# Patient Record
Sex: FEMALE | Race: ASIAN | Employment: OTHER | URBAN - METROPOLITAN AREA
[De-identification: names, ages, dates, MRNs, and addresses within clinical notes are randomized per-mention and may not be internally consistent; named-entity substitution may affect disease eponyms.]

---

## 2020-07-16 ENCOUNTER — HOSPITAL ENCOUNTER (OUTPATIENT)
Dept: VASCULAR SURGERY | Age: 45
Discharge: HOME | End: 2020-07-16
Attending: PSYCHIATRY & NEUROLOGY
Payer: OTHER GOVERNMENT

## 2020-07-16 DIAGNOSIS — H53.9 UNSPECIFIED VISUAL DISTURBANCE: ICD-10-CM

## 2020-07-16 LAB
LEFT CCA DIST DIAS: 24.1 CM/S
LEFT CCA DIST SYS: 79.7 CM/S
LEFT CCA PROX DIAS: 18.9 CM/S
LEFT CCA PROX SYS: 113.5 CM/S
LEFT ECA DIAS: 16.31 CM/S
LEFT ECA SYS: 66.8 CM/S
LEFT ICA DIST DIAS: 34.4 CM/S
LEFT ICA DIST SYS: 77.1 CM/S
LEFT ICA MID DIAS: 30.5 CM/S
LEFT ICA MID SYS: 65.5 CM/S
LEFT ICA PROX DIAS: 18.9 CM/S
LEFT ICA PROX SYS: 51.3 CM/S
LEFT ICA/CCA SYS: 0.97
LEFT SUBCLAVIAN DIAS: 0 CM/S
LEFT SUBCLAVIAN SYS: 158.9 CM/S
LEFT VERTEBRAL DIAS: 23.74 CM/S
LEFT VERTEBRAL SYS: 47.9 CM/S
RIGHT CCA DIST DIAS: 21.7 CM/S
RIGHT CCA DIST SYS: 81.7 CM/S
RIGHT CCA PROX DIAS: 15.1 CM/S
RIGHT CCA PROX SYS: 79 CM/S
RIGHT ECA DIAS: 13.83 CM/S
RIGHT ECA SYS: 75.1 CM/S
RIGHT ICA DIST DIAS: 20.4 CM/S
RIGHT ICA DIST SYS: 35.8 CM/S
RIGHT ICA MID DIAS: 34.9 CM/S
RIGHT ICA MID SYS: 70.4 CM/S
RIGHT ICA PROX DIAS: 14 CM/S
RIGHT ICA PROX SYS: 36 CM/S
RIGHT ICA/CCA SYS: 0.9
RIGHT SUBCLAVIAN DIAS: 0 CM/S
RIGHT SUBCLAVIAN SYS: 174.6 CM/S
RIGHT VERTEBRAL DIAS: 19.72 CM/S
RIGHT VERTEBRAL SYS: 43.3 CM/S

## 2020-07-16 PROCEDURE — 93880 EXTRACRANIAL BILAT STUDY: CPT

## 2021-04-08 ENCOUNTER — HOSPITAL ENCOUNTER (OUTPATIENT)
Dept: LAB | Age: 46
Discharge: HOME | End: 2021-04-08
Payer: OTHER GOVERNMENT

## 2021-04-08 LAB — VIT B12 SERPL-MCNC: 837 PG/ML (ref 211–911)

## 2021-04-08 PROCEDURE — 36415 COLL VENOUS BLD VENIPUNCTURE: CPT

## 2021-04-08 PROCEDURE — 82607 VITAMIN B-12: CPT

## 2021-04-09 LAB
FAX TO INFO,FAXT: NORMAL
FAX TO NUMBER,FAXN: NORMAL

## 2021-07-22 ENCOUNTER — HOSPITAL ENCOUNTER (INPATIENT)
Age: 46
LOS: 4 days | Discharge: HOME | DRG: 177 | End: 2021-07-26
Attending: EMERGENCY MEDICINE | Admitting: FAMILY MEDICINE
Payer: OTHER GOVERNMENT

## 2021-07-22 ENCOUNTER — APPOINTMENT (OUTPATIENT)
Dept: CT IMAGING | Age: 46
DRG: 177 | End: 2021-07-22
Attending: PHYSICIAN ASSISTANT
Payer: OTHER GOVERNMENT

## 2021-07-22 ENCOUNTER — APPOINTMENT (OUTPATIENT)
Dept: GENERAL RADIOLOGY | Age: 46
DRG: 177 | End: 2021-07-22
Attending: PHYSICIAN ASSISTANT
Payer: OTHER GOVERNMENT

## 2021-07-22 DIAGNOSIS — U07.1 ACUTE RESPIRATORY FAILURE DUE TO COVID-19 (HCC): ICD-10-CM

## 2021-07-22 DIAGNOSIS — U07.1 PNEUMONIA DUE TO COVID-19 VIRUS: Primary | ICD-10-CM

## 2021-07-22 DIAGNOSIS — R09.02 HYPOXIA: ICD-10-CM

## 2021-07-22 DIAGNOSIS — J12.82 PNEUMONIA DUE TO COVID-19 VIRUS: Primary | ICD-10-CM

## 2021-07-22 DIAGNOSIS — J96.00 ACUTE RESPIRATORY FAILURE DUE TO COVID-19 (HCC): ICD-10-CM

## 2021-07-22 PROBLEM — E66.9 OBESITY (BMI 30-39.9): Status: ACTIVE | Noted: 2021-07-22

## 2021-07-22 LAB
ALBUMIN SERPL-MCNC: 3 G/DL (ref 3.4–5)
ALBUMIN/GLOB SERPL: 0.6 {RATIO} (ref 0.8–1.7)
ALP SERPL-CCNC: 124 U/L (ref 45–117)
ALT SERPL-CCNC: 77 U/L (ref 13–56)
ANION GAP SERPL CALC-SCNC: 9 MMOL/L (ref 3–18)
APPEARANCE UR: CLEAR
AST SERPL-CCNC: 81 U/L (ref 10–38)
BASOPHILS # BLD: 0 K/UL (ref 0–0.1)
BASOPHILS NFR BLD: 0 % (ref 0–2)
BILIRUB SERPL-MCNC: 0.3 MG/DL (ref 0.2–1)
BILIRUB UR QL: NEGATIVE
BNP SERPL-MCNC: 123 PG/ML (ref 0–450)
BUN SERPL-MCNC: 8 MG/DL (ref 7–18)
BUN/CREAT SERPL: 14 (ref 12–20)
CA-I BLD-SCNC: 1.16 MMOL/L (ref 1.12–1.32)
CALCIUM SERPL-MCNC: 8.7 MG/DL (ref 8.5–10.1)
CHLORIDE SERPL-SCNC: 104 MMOL/L (ref 100–111)
CK MB CFR SERPL CALC: NORMAL % (ref 0–4)
CK MB SERPL-MCNC: <1 NG/ML (ref 5–25)
CK SERPL-CCNC: 51 U/L (ref 26–192)
CO2 SERPL-SCNC: 29 MMOL/L (ref 21–32)
COLOR UR: YELLOW
COVID-19 RAPID TEST, COVR: DETECTED
CREAT SERPL-MCNC: 0.58 MG/DL (ref 0.6–1.3)
D DIMER PPP FEU-MCNC: 1.89 UG/ML(FEU)
DIFFERENTIAL METHOD BLD: ABNORMAL
EOSINOPHIL # BLD: 0 K/UL (ref 0–0.4)
EOSINOPHIL NFR BLD: 0 % (ref 0–5)
ERYTHROCYTE [DISTWIDTH] IN BLOOD BY AUTOMATED COUNT: 12.8 % (ref 11.6–14.5)
GLOBULIN SER CALC-MCNC: 5 G/DL (ref 2–4)
GLUCOSE SERPL-MCNC: 102 MG/DL (ref 74–99)
GLUCOSE UR STRIP.AUTO-MCNC: NEGATIVE MG/DL
HCG SERPL QL: NEGATIVE
HCG UR QL: NEGATIVE
HCT VFR BLD AUTO: 46.9 % (ref 35–45)
HGB BLD-MCNC: 15 G/DL (ref 12–16)
HGB UR QL STRIP: NEGATIVE
KETONES UR QL STRIP.AUTO: NEGATIVE MG/DL
LACTATE BLD-SCNC: 1.39 MMOL/L (ref 0.4–2)
LEUKOCYTE ESTERASE UR QL STRIP.AUTO: NEGATIVE
LYMPHOCYTES # BLD: 1.1 K/UL (ref 0.9–3.6)
LYMPHOCYTES NFR BLD: 14 % (ref 21–52)
MCH RBC QN AUTO: 29.1 PG (ref 24–34)
MCHC RBC AUTO-ENTMCNC: 32 G/DL (ref 31–37)
MCV RBC AUTO: 90.9 FL (ref 74–97)
MONOCYTES # BLD: 0.3 K/UL (ref 0.05–1.2)
MONOCYTES NFR BLD: 4 % (ref 3–10)
NEUTS BAND NFR BLD MANUAL: 6 % (ref 0–5)
NEUTS SEG # BLD: 6.2 K/UL (ref 1.8–8)
NEUTS SEG NFR BLD: 76 % (ref 40–73)
NITRITE UR QL STRIP.AUTO: NEGATIVE
PH UR STRIP: 8.5 [PH] (ref 5–8)
PLATELET # BLD AUTO: 263 K/UL (ref 135–420)
PMV BLD AUTO: 9.7 FL (ref 9.2–11.8)
POTASSIUM SERPL-SCNC: 4 MMOL/L (ref 3.5–5.5)
PROT SERPL-MCNC: 8 G/DL (ref 6.4–8.2)
PROT UR STRIP-MCNC: NEGATIVE MG/DL
RBC # BLD AUTO: 5.16 M/UL (ref 4.2–5.3)
RBC MORPH BLD: ABNORMAL
SODIUM SERPL-SCNC: 142 MMOL/L (ref 136–145)
SOURCE, COVRS: ABNORMAL
SP GR UR REFRACTOMETRY: 1.02 (ref 1–1.03)
TROPONIN I SERPL-MCNC: <0.02 NG/ML (ref 0–0.04)
UROBILINOGEN UR QL STRIP.AUTO: 1 EU/DL (ref 0.2–1)
WBC # BLD AUTO: 7.6 K/UL (ref 4.6–13.2)
WBC MORPH BLD: ABNORMAL

## 2021-07-22 PROCEDURE — 94761 N-INVAS EAR/PLS OXIMETRY MLT: CPT

## 2021-07-22 PROCEDURE — 74011250637 HC RX REV CODE- 250/637: Performed by: PHYSICIAN ASSISTANT

## 2021-07-22 PROCEDURE — 83880 ASSAY OF NATRIURETIC PEPTIDE: CPT

## 2021-07-22 PROCEDURE — 87040 BLOOD CULTURE FOR BACTERIA: CPT

## 2021-07-22 PROCEDURE — 71275 CT ANGIOGRAPHY CHEST: CPT

## 2021-07-22 PROCEDURE — 81003 URINALYSIS AUTO W/O SCOPE: CPT

## 2021-07-22 PROCEDURE — 74011250637 HC RX REV CODE- 250/637: Performed by: FAMILY MEDICINE

## 2021-07-22 PROCEDURE — 81025 URINE PREGNANCY TEST: CPT

## 2021-07-22 PROCEDURE — 85379 FIBRIN DEGRADATION QUANT: CPT

## 2021-07-22 PROCEDURE — 71045 X-RAY EXAM CHEST 1 VIEW: CPT

## 2021-07-22 PROCEDURE — 86141 C-REACTIVE PROTEIN HS: CPT

## 2021-07-22 PROCEDURE — 84703 CHORIONIC GONADOTROPIN ASSAY: CPT

## 2021-07-22 PROCEDURE — 80053 COMPREHEN METABOLIC PANEL: CPT

## 2021-07-22 PROCEDURE — 65660000000 HC RM CCU STEPDOWN

## 2021-07-22 PROCEDURE — 74011000636 HC RX REV CODE- 636: Performed by: EMERGENCY MEDICINE

## 2021-07-22 PROCEDURE — 83605 ASSAY OF LACTIC ACID: CPT

## 2021-07-22 PROCEDURE — 87635 SARS-COV-2 COVID-19 AMP PRB: CPT

## 2021-07-22 PROCEDURE — 85025 COMPLETE CBC W/AUTO DIFF WBC: CPT

## 2021-07-22 PROCEDURE — 99285 EMERGENCY DEPT VISIT HI MDM: CPT

## 2021-07-22 PROCEDURE — 96374 THER/PROPH/DIAG INJ IV PUSH: CPT

## 2021-07-22 PROCEDURE — 74011250636 HC RX REV CODE- 250/636: Performed by: PHYSICIAN ASSISTANT

## 2021-07-22 PROCEDURE — 82330 ASSAY OF CALCIUM: CPT

## 2021-07-22 PROCEDURE — 82553 CREATINE MB FRACTION: CPT

## 2021-07-22 PROCEDURE — 74011250636 HC RX REV CODE- 250/636: Performed by: FAMILY MEDICINE

## 2021-07-22 RX ORDER — ONDANSETRON 2 MG/ML
4 INJECTION INTRAMUSCULAR; INTRAVENOUS
Status: DISCONTINUED | OUTPATIENT
Start: 2021-07-22 | End: 2021-07-26 | Stop reason: HOSPADM

## 2021-07-22 RX ORDER — SODIUM CHLORIDE 0.9 % (FLUSH) 0.9 %
5-40 SYRINGE (ML) INJECTION EVERY 8 HOURS
Status: DISCONTINUED | OUTPATIENT
Start: 2021-07-22 | End: 2021-07-26 | Stop reason: HOSPADM

## 2021-07-22 RX ORDER — POLYETHYLENE GLYCOL 3350 17 G/17G
17 POWDER, FOR SOLUTION ORAL DAILY PRN
Status: DISCONTINUED | OUTPATIENT
Start: 2021-07-22 | End: 2021-07-26 | Stop reason: HOSPADM

## 2021-07-22 RX ORDER — DEXAMETHASONE SODIUM PHOSPHATE 10 MG/ML
10 INJECTION INTRAMUSCULAR; INTRAVENOUS
Status: COMPLETED | OUTPATIENT
Start: 2021-07-22 | End: 2021-07-22

## 2021-07-22 RX ORDER — DULOXETIN HYDROCHLORIDE 30 MG/1
30 CAPSULE, DELAYED RELEASE ORAL DAILY
COMMUNITY

## 2021-07-22 RX ORDER — DEXAMETHASONE 4 MG/1
6 TABLET ORAL DAILY
Status: DISCONTINUED | OUTPATIENT
Start: 2021-07-22 | End: 2021-07-26 | Stop reason: HOSPADM

## 2021-07-22 RX ORDER — LORAZEPAM 1 MG/1
1 TABLET ORAL
Status: COMPLETED | OUTPATIENT
Start: 2021-07-22 | End: 2021-07-22

## 2021-07-22 RX ORDER — SODIUM CHLORIDE 0.9 % (FLUSH) 0.9 %
5-40 SYRINGE (ML) INJECTION AS NEEDED
Status: DISCONTINUED | OUTPATIENT
Start: 2021-07-22 | End: 2021-07-26 | Stop reason: HOSPADM

## 2021-07-22 RX ORDER — ACETAMINOPHEN 650 MG/1
650 SUPPOSITORY RECTAL
Status: DISCONTINUED | OUTPATIENT
Start: 2021-07-22 | End: 2021-07-26 | Stop reason: HOSPADM

## 2021-07-22 RX ORDER — ASPIRIN 81 MG/1
81 TABLET ORAL DAILY
COMMUNITY

## 2021-07-22 RX ORDER — FAMOTIDINE 20 MG/1
20 TABLET, FILM COATED ORAL 2 TIMES DAILY
Status: DISCONTINUED | OUTPATIENT
Start: 2021-07-22 | End: 2021-07-26 | Stop reason: HOSPADM

## 2021-07-22 RX ORDER — ONDANSETRON 4 MG/1
4 TABLET, ORALLY DISINTEGRATING ORAL
Status: DISCONTINUED | OUTPATIENT
Start: 2021-07-22 | End: 2021-07-26 | Stop reason: HOSPADM

## 2021-07-22 RX ORDER — ENOXAPARIN SODIUM 100 MG/ML
30 INJECTION SUBCUTANEOUS EVERY 12 HOURS
Status: DISCONTINUED | OUTPATIENT
Start: 2021-07-22 | End: 2021-07-26 | Stop reason: HOSPADM

## 2021-07-22 RX ORDER — ACETAMINOPHEN 325 MG/1
650 TABLET ORAL
Status: DISCONTINUED | OUTPATIENT
Start: 2021-07-22 | End: 2021-07-26 | Stop reason: HOSPADM

## 2021-07-22 RX ADMIN — LORAZEPAM 1 MG: 1 TABLET ORAL at 19:00

## 2021-07-22 RX ADMIN — ENOXAPARIN SODIUM 30 MG: 40 INJECTION SUBCUTANEOUS at 21:45

## 2021-07-22 RX ADMIN — DEXAMETHASONE 6 MG: 4 TABLET ORAL at 21:45

## 2021-07-22 RX ADMIN — FAMOTIDINE 20 MG: 20 TABLET ORAL at 21:46

## 2021-07-22 RX ADMIN — IOPAMIDOL 100 ML: 755 INJECTION, SOLUTION INTRAVENOUS at 19:33

## 2021-07-22 RX ADMIN — Medication 10 ML: at 21:46

## 2021-07-22 RX ADMIN — SODIUM CHLORIDE, SODIUM LACTATE, POTASSIUM CHLORIDE, AND CALCIUM CHLORIDE 1000 ML: 600; 310; 30; 20 INJECTION, SOLUTION INTRAVENOUS at 18:02

## 2021-07-22 RX ADMIN — DEXAMETHASONE SODIUM PHOSPHATE 10 MG: 10 INJECTION, SOLUTION INTRAMUSCULAR; INTRAVENOUS at 18:02

## 2021-07-22 NOTE — H&P
History & Physical    Patient: Tristan eSnior MRN: [id number]  CSN: [id number]    YOB: 1975  Age: 54 y.o. Sex: female      DOA: 7/22/2021  Primary Care Provider:  Tiffanie Wilde MD      Assessment/Plan     Patient Active Problem List   Diagnosis Code    Hypoxia R09.02    Pneumonia due to COVID-19 virus U07.1, J12.82    Obesity (BMI 30-39. 9) E779     58-year-old female with obesity who is unvaccinated for Covid is admitted for pneumonia due to COVID-19 and hypoxia. COVID-19 pneumonia with hypoxia  2 L nasal cannula for supplemental oxygen, wean as tolerated  Oral dexamethasone protocol for 10 days  Covid labs  Not a candidate for remdesivir as she is over 7 days of illness    Obesityrisk factor for increased severity of Covid infection  --Aggressive lifestyle modification needed  --Follow up hemoglobin A1C and lipid panel    Full code    Prophylaxis: Pepcid p.o., Lovenox SQ    Estimated length of stay : 3-4 nights    MD Magan Vazquezurnist  ----------------------------------------------------------------------------------------------------------------------------------------------------------------------------------------------------------------  CC: Covid positive, shortness of breath       HPI:     Tristan Senior is a 54 y.o. female with obesity who is not vaccinated for Covid and tested positive last week at her PCP office presents via EMS for shortness of breath. Her son is on a travel baseball team and she was exposed from another child who was symptomatic while at an away game. Unfortunately, they were too busy with baseball over the summer to get the vaccine. Multiple family members of hers are also now Covid positive. She has had fever, congestion, myalgias, chest tightness, and cough. Past Medical History:   Diagnosis Date    Obesity (BMI 30-39. 9) 7/22/2021       History reviewed. No pertinent surgical history.     Family History   Problem Relation Age of Onset    Glaucoma Mother        Social History     Socioeconomic History    Marital status:      Spouse name: Not on file    Number of children: Not on file    Years of education: Not on file    Highest education level: Not on file   Tobacco Use    Smoking status: Never Smoker    Smokeless tobacco: Never Used   Other Topics Concern     Social Determinants of Health     Financial Resource Strain:     Difficulty of Paying Living Expenses:    Food Insecurity:     Worried About Running Out of Food in the Last Year:     [address] in the Last Year:    Transportation Needs:     Lack of Transportation (Medical):  Lack of Transportation (Non-Medical):    Physical Activity:     Days of Exercise per Week:     Minutes of Exercise per Session:    Stress:     Feeling of Stress :    Social Connections:     Frequency of Communication with Friends and Family:     Frequency of Social Gatherings with Friends and Family:     Attends Jew Services:     Active Member of Clubs or Organizations:     Attends Club or Organization Meetings:     Marital Status:        Prior to Admission medications    Medication Sig Start Date End Date Taking? Authorizing Provider   DULoxetine (Cymbalta) 30 mg capsule Take 30 mg by mouth daily. Yes Other, MD Eliseo   aspirin delayed-release 81 mg tablet Take 81 mg by mouth daily. Yes Provider, Historical       No Known Allergies    Review of Systems  Gen: +fever, chills, malaise, no weight loss/gain. Heent: +headache, rhinorrhea, sore throat. Heart: +chest pain, FERNANDEZ. Resp: +cough, wheezing and shortness of breath. GI: No nausea, vomiting, diarrhea, constipation, melena or hematochezia. : No urinary obstruction, dysuria or hematuria. Derm: No rash, new skin lesion or pruritis. Musc/skeletal: no bone or joint complaints. Vasc: No edema, cyanosis or claudication. Endo: No heat/cold intolerance, no polyuria,polydipsia or polyphagia. Neuro: No unilateral weakness, numbness, tingling. No seizures. Heme: No easy bruising or bleeding. Physical Exam:     Physical Exam:  Visit Vitals  /84   Pulse 66   Temp 98.4 °F (36.9 °C)   Resp 18   Wt 93.1 kg (205 lb 4.8 oz)   LMP 2021 (Approximate)   SpO2 94%    O2 Flow Rate (L/min): 2 l/min O2 Device: Nasal cannula    Temp (24hrs), Av.3 °F (36.8 °C), Min:97.7 °F (36.5 °C), Max:98.7 °F (37.1 °C)    1901 -  07  In: 480 [P.O.:480]  Out: 600 [Urine:600]   701 - 1900  In: 1000 [I.V.:1000]  Out: -     General:  Awake, cooperative, no distress. Head:  Normocephalic, without obvious abnormality, atraumatic. Eyes:  Conjunctivae/corneas clear, sclera anicteric. Neck: Supple, symmetrical, trachea midline, no adenopathy. Lungs:   Coarse breath sounds in the bilateral lower lung bases. Heart:  Regular rate and rhythm, S1, S2 normal, no murmur, click, rub or gallop. Abdomen: Soft, non-tender. Bowel sounds normal. No masses,  No organomegaly. Extremities: Extremities normal, atraumatic, no cyanosis or edema. Capillary refill normal.   Pulses: 2+ and symmetric all extremities. Skin: Skin color pink, turgor normal. No rashes or lesions   Neurologic: No focal motor or sensory deficit. Labs Reviewed: All lab results for the last 24 hours reviewed.   Recent Results (from the past 24 hour(s))   POC LACTIC ACID    Collection Time: 21  5:45 PM   Result Value Ref Range    Lactic Acid (POC) 1.39 0.40 - 2.00 mmol/L   CBC WITH AUTOMATED DIFF    Collection Time: 21  5:50 PM   Result Value Ref Range    WBC 7.6 4.6 - 13.2 K/uL    RBC 5.16 4.20 - 5.30 M/uL    HGB 15.0 12.0 - 16.0 g/dL    HCT 46.9 (H) 35.0 - 45.0 %    MCV 90.9 74.0 - 97.0 FL    MCH 29.1 24.0 - 34.0 PG    MCHC 32.0 31.0 - 37.0 g/dL    RDW 12.8 11.6 - 14.5 %    PLATELET 271 241 - 242 K/uL    MPV 9.7 9.2 - 11.8 FL    NEUTROPHILS 76 (H) 40 - 73 %    BAND NEUTROPHILS 6 (H) 0 - 5 % LYMPHOCYTES 14 (L) 21 - 52 %    MONOCYTES 4 3 - 10 %    EOSINOPHILS 0 0 - 5 %    BASOPHILS 0 0 - 2 %    ABS. NEUTROPHILS 6.2 1.8 - 8.0 K/UL    ABS. LYMPHOCYTES 1.1 0.9 - 3.6 K/UL    ABS. MONOCYTES 0.3 0.05 - 1.2 K/UL    ABS. EOSINOPHILS 0.0 0.0 - 0.4 K/UL    ABS. BASOPHILS 0.0 0.0 - 0.1 K/UL    DF MANUAL      RBC COMMENTS NORMOCYTIC, NORMOCHROMIC      WBC COMMENTS REACTIVE LYMPHS     METABOLIC PANEL, COMPREHENSIVE    Collection Time: 07/22/21  5:50 PM   Result Value Ref Range    Sodium 142 136 - 145 mmol/L    Potassium 4.0 3.5 - 5.5 mmol/L    Chloride 104 100 - 111 mmol/L    CO2 29 21 - 32 mmol/L    Anion gap 9 3.0 - 18 mmol/L    Glucose 102 (H) 74 - 99 mg/dL    BUN 8 7.0 - 18 MG/DL    Creatinine 0.58 (L) 0.6 - 1.3 MG/DL    BUN/Creatinine ratio 14 12 - 20      GFR est AA >60 >60 ml/min/1.73m2    GFR est non-AA >60 >60 ml/min/1.73m2    Calcium 8.7 8.5 - 10.1 MG/DL    Bilirubin, total 0.3 0.2 - 1.0 MG/DL    ALT (SGPT) 77 (H) 13 - 56 U/L    AST (SGOT) 81 (H) 10 - 38 U/L    Alk.  phosphatase 124 (H) 45 - 117 U/L    Protein, total 8.0 6.4 - 8.2 g/dL    Albumin 3.0 (L) 3.4 - 5.0 g/dL    Globulin 5.0 (H) 2.0 - 4.0 g/dL    A-G Ratio 0.6 (L) 0.8 - 1.7     CARDIAC PANEL,(CK, CKMB & TROPONIN)    Collection Time: 07/22/21  5:50 PM   Result Value Ref Range    CK - MB <1.0 <3.6 ng/ml    CK-MB Index  0.0 - 4.0 %     CALCULATION NOT PERFORMED WHEN RESULT IS BELOW LINEAR LIMIT    CK 51 26 - 192 U/L    Troponin-I, QT <0.02 0.0 - 0.045 NG/ML   NT-PRO BNP    Collection Time: 07/22/21  5:50 PM   Result Value Ref Range    NT pro- 0 - 450 PG/ML   D DIMER    Collection Time: 07/22/21  5:50 PM   Result Value Ref Range    D DIMER 1.89 (H) <0.46 ug/ml(FEU)   HCG QL SERUM    Collection Time: 07/22/21  5:50 PM   Result Value Ref Range    HCG, Ql. Negative NEG     IONIZED CALCIUM    Collection Time: 07/22/21  6:50 PM   Result Value Ref Range    Calcium, ionized 1.16 1.12 - 1.32 mmol/L   URINALYSIS W/ RFLX MICROSCOPIC    Collection Time: 07/22/21  7:00 PM   Result Value Ref Range    Color YELLOW      Appearance CLEAR      Specific gravity 1.017 1.005 - 1.030      pH (UA) 8.5 (H) 5.0 - 8.0      Protein Negative NEG mg/dL    Glucose Negative NEG mg/dL    Ketone Negative NEG mg/dL    Bilirubin Negative NEG      Blood Negative NEG      Urobilinogen 1.0 0.2 - 1.0 EU/dL    Nitrites Negative NEG      Leukocyte Esterase Negative NEG     COVID-19 RAPID TEST    Collection Time: 07/22/21  7:00 PM   Result Value Ref Range    Specimen source Nasopharyngeal      COVID-19 rapid test Detected (AA) NOTD     HCG URINE, QL. - POC    Collection Time: 07/22/21  7:06 PM   Result Value Ref Range    Pregnancy test,urine (POC) Negative NEG         Results  XR CHEST PORT (Accession [id number]) (Order [id number])  Allergies     No Known Allergies   Exam Information    Status Exam Begun  Exam Ended    Final [99] 7/22/2021 18:06 7/22/2021  6:20 PM [id number]  6:20 PM   Result Information    Status: Final result (Exam End: 7/22/2021 18:20) Provider Status: Open   Study Result    Narrative & Impression   EXAM:  AP Portable Chest X-ray 1 view      INDICATION: Shortness of breath Covid positive cough     COMPARISON: None     _______________     FINDINGS:  Heart and mediastinal contours are within normal limits for portable  radiograph. There are patchy alveolar and interstitial infiltrates of both lung  bases left greater than right. . There are no pleural effusions. No acute osseous  findings.     ________________        IMPRESSION  Patchy interstitial and alveolar infiltrates left greater than  right.          Results  CTA CHEST W OR W WO CONT (Accession [id number]) (Order [id number])  Allergies     No Known Allergies   Exam Information    Status Exam Begun  Exam Ended    Final [99] 7/22/2021 19:14 7/22/2021  7:43 PM [id number]  7:43 PM   Result Information    Status: Final result (Exam End: 7/22/2021 19:43) Provider Status: Open   Study Result    Narrative & Impression   EXAM: CTA chest     INDICATION: Hypoxia Covid positive     COMPARISON: None     TECHNIQUE: Axial CT imaging from the thoracic inlet through the diaphragm with  intravenous contrast. Coronal and sagittal MIP reformats were generated. One or  more dose reduction techniques were used on this CT: automated exposure control,  adjustment of the mAs and/or kVp according to patient size, and iterative  reconstruction techniques. The specific techniques used on this CT exam have  been documented in the patient's electronic medical record. Digital Imaging and  Communications in Medicine (DICOM) format image data are available to  nonaffiliated external healthcare facilities or entities on a secure, media  free, reciprocally searchable basis with patient authorization for at least a  12-month period after this study.     _______________     FINDINGS:     EXAM QUALITY: Overall exam quality is satisfactory. Pulmonary arterial  enhancement is adequate with adequate breath hold and no significant artifact.     PULMONARY ARTERIES: No evidence of pulmonary embolism.     LYMPH Nodes: No enlarged lymph nodes seen.     PLEURA: No pleural effusion seen.     HEART: Normal in size without pericardial effusion.     VASCULATURE/MEDIASTINUM: There is no aortic dissection. Small hiatal hernia  present.     LUNGS: There are diffuse bilateral patchy infiltrates and groundglass densities  suggesting atypical pneumonia. The findings are typical in appearance for Covid  19 pneumonia.     AIRWAY: Normal.     UPPER ABDOMEN: Unremarkable.     OTHER: No acute or aggressive osseous abnormalities identified.     _______________     IMPRESSION     No evidence of a pulmonary embolism or aortic dissection.     Diffuse patchy infiltrates and groundglass densities bilaterally suggesting  atypical pneumonia. The findings are typical in appearance for Covid 19  pneumonia.

## 2021-07-22 NOTE — ED TRIAGE NOTES
Patient arrives via EMS d/t COVID+ and general illness    Patient noted 86-87% on RA. 2L NC placed and pulse ox noted to increase to 91-92%    Patient dx'd with COVID last Thursday.  +sick contacts at home

## 2021-07-23 LAB
ALBUMIN SERPL-MCNC: 2.7 G/DL (ref 3.4–5)
ALBUMIN/GLOB SERPL: 0.6 {RATIO} (ref 0.8–1.7)
ALP SERPL-CCNC: 113 U/L (ref 45–117)
ALT SERPL-CCNC: 73 U/L (ref 13–56)
ANION GAP SERPL CALC-SCNC: 5 MMOL/L (ref 3–18)
AST SERPL-CCNC: 65 U/L (ref 10–38)
BILIRUB SERPL-MCNC: 0.3 MG/DL (ref 0.2–1)
BUN SERPL-MCNC: 7 MG/DL (ref 7–18)
BUN/CREAT SERPL: 14 (ref 12–20)
CALCIUM SERPL-MCNC: 8.9 MG/DL (ref 8.5–10.1)
CHLORIDE SERPL-SCNC: 107 MMOL/L (ref 100–111)
CHOLEST SERPL-MCNC: 183 MG/DL
CO2 SERPL-SCNC: 29 MMOL/L (ref 21–32)
CREAT SERPL-MCNC: 0.49 MG/DL (ref 0.6–1.3)
CRP SERPL HS-MCNC: >9.5 MG/L
ERYTHROCYTE [DISTWIDTH] IN BLOOD BY AUTOMATED COUNT: 12.6 % (ref 11.6–14.5)
FIBRINOGEN PPP-MCNC: 630 MG/DL (ref 210–451)
GLOBULIN SER CALC-MCNC: 4.6 G/DL (ref 2–4)
GLUCOSE SERPL-MCNC: 152 MG/DL (ref 74–99)
HBA1C MFR BLD: 6.2 % (ref 4.2–5.6)
HCT VFR BLD AUTO: 42.5 % (ref 35–45)
HDLC SERPL-MCNC: 47 MG/DL (ref 40–60)
HDLC SERPL: 3.9 {RATIO} (ref 0–5)
HGB BLD-MCNC: 13.8 G/DL (ref 12–16)
LDLC SERPL CALC-MCNC: 87.6 MG/DL (ref 0–100)
LIPID PROFILE,FLP: ABNORMAL
MCH RBC QN AUTO: 29.1 PG (ref 24–34)
MCHC RBC AUTO-ENTMCNC: 32.5 G/DL (ref 31–37)
MCV RBC AUTO: 89.7 FL (ref 74–97)
PLATELET # BLD AUTO: 247 K/UL (ref 135–420)
PMV BLD AUTO: 9.7 FL (ref 9.2–11.8)
POTASSIUM SERPL-SCNC: 3.9 MMOL/L (ref 3.5–5.5)
PROT SERPL-MCNC: 7.3 G/DL (ref 6.4–8.2)
RBC # BLD AUTO: 4.74 M/UL (ref 4.2–5.3)
SODIUM SERPL-SCNC: 141 MMOL/L (ref 136–145)
TRIGL SERPL-MCNC: 242 MG/DL (ref ?–150)
VLDLC SERPL CALC-MCNC: 48.4 MG/DL
WBC # BLD AUTO: 5.1 K/UL (ref 4.6–13.2)

## 2021-07-23 PROCEDURE — 80053 COMPREHEN METABOLIC PANEL: CPT

## 2021-07-23 PROCEDURE — 65660000000 HC RM CCU STEPDOWN

## 2021-07-23 PROCEDURE — 74011250636 HC RX REV CODE- 250/636: Performed by: FAMILY MEDICINE

## 2021-07-23 PROCEDURE — 83036 HEMOGLOBIN GLYCOSYLATED A1C: CPT

## 2021-07-23 PROCEDURE — 85027 COMPLETE CBC AUTOMATED: CPT

## 2021-07-23 PROCEDURE — 36415 COLL VENOUS BLD VENIPUNCTURE: CPT

## 2021-07-23 PROCEDURE — 74011250637 HC RX REV CODE- 250/637: Performed by: HOSPITALIST

## 2021-07-23 PROCEDURE — 80061 LIPID PANEL: CPT

## 2021-07-23 PROCEDURE — 77010033678 HC OXYGEN DAILY

## 2021-07-23 PROCEDURE — 85384 FIBRINOGEN ACTIVITY: CPT

## 2021-07-23 PROCEDURE — 74011250637 HC RX REV CODE- 250/637: Performed by: FAMILY MEDICINE

## 2021-07-23 RX ORDER — ALBUTEROL SULFATE 90 UG/1
2 AEROSOL, METERED RESPIRATORY (INHALATION)
Status: DISCONTINUED | OUTPATIENT
Start: 2021-07-23 | End: 2021-07-26 | Stop reason: HOSPADM

## 2021-07-23 RX ORDER — ASCORBIC ACID 250 MG
500 TABLET ORAL DAILY
Status: DISCONTINUED | OUTPATIENT
Start: 2021-07-23 | End: 2021-07-26 | Stop reason: HOSPADM

## 2021-07-23 RX ORDER — MELATONIN
2000 DAILY
Status: DISCONTINUED | OUTPATIENT
Start: 2021-07-23 | End: 2021-07-26 | Stop reason: HOSPADM

## 2021-07-23 RX ORDER — DULOXETIN HYDROCHLORIDE 30 MG/1
30 CAPSULE, DELAYED RELEASE ORAL DAILY
Status: DISCONTINUED | OUTPATIENT
Start: 2021-07-23 | End: 2021-07-26 | Stop reason: HOSPADM

## 2021-07-23 RX ORDER — CALCIUM CARB/MAGNESIUM CARB 311-232MG
5 TABLET ORAL
Status: DISCONTINUED | OUTPATIENT
Start: 2021-07-23 | End: 2021-07-26 | Stop reason: HOSPADM

## 2021-07-23 RX ORDER — ZINC SULFATE 50(220)MG
1 CAPSULE ORAL DAILY
Status: DISCONTINUED | OUTPATIENT
Start: 2021-07-23 | End: 2021-07-26 | Stop reason: HOSPADM

## 2021-07-23 RX ORDER — GUAIFENESIN/DEXTROMETHORPHAN 100-10MG/5
5 SYRUP ORAL
Status: DISCONTINUED | OUTPATIENT
Start: 2021-07-23 | End: 2021-07-26 | Stop reason: HOSPADM

## 2021-07-23 RX ADMIN — ALBUTEROL SULFATE 2 PUFF: 108 AEROSOL, METERED RESPIRATORY (INHALATION) at 17:50

## 2021-07-23 RX ADMIN — FAMOTIDINE 20 MG: 20 TABLET ORAL at 08:16

## 2021-07-23 RX ADMIN — Medication 10 ML: at 22:09

## 2021-07-23 RX ADMIN — ENOXAPARIN SODIUM 30 MG: 40 INJECTION SUBCUTANEOUS at 08:15

## 2021-07-23 RX ADMIN — DULOXETINE HYDROCHLORIDE 30 MG: 30 CAPSULE, DELAYED RELEASE ORAL at 08:16

## 2021-07-23 RX ADMIN — Medication 500 MG: at 12:18

## 2021-07-23 RX ADMIN — Medication 2000 UNITS: at 12:18

## 2021-07-23 RX ADMIN — Medication 5 MG: at 22:09

## 2021-07-23 RX ADMIN — DEXAMETHASONE 6 MG: 4 TABLET ORAL at 08:15

## 2021-07-23 RX ADMIN — ZINC SULFATE 220 MG (50 MG) CAPSULE 1 CAPSULE: CAPSULE at 12:18

## 2021-07-23 RX ADMIN — ENOXAPARIN SODIUM 30 MG: 40 INJECTION SUBCUTANEOUS at 22:09

## 2021-07-23 RX ADMIN — Medication 10 ML: at 17:50

## 2021-07-23 RX ADMIN — GUAIFENESIN AND DEXTROMETHORPHAN 5 ML: 100; 10 SYRUP ORAL at 17:50

## 2021-07-23 RX ADMIN — FAMOTIDINE 20 MG: 20 TABLET ORAL at 22:09

## 2021-07-23 NOTE — PROGRESS NOTES
CM spoke with pt to discuss transition of care. Prior to admission pt was independent. Pt is  and both her  and son also have COVID. Please consider ordering therapy when appropriate to assist with determining an appropriate transition of care. Pt does not have any DME, however, was beginning to have difficulty ambulating. Anticipate pt will transition home with physician follow up vs [facility] and physician follow up. CM to continue to follow and assist.    Care Management Interventions  Mode of Transport at Discharge:  Other (see comment) (Family)  Transition of Care Consult (CM Consult): Discharge Planning  Health Maintenance Reviewed: Yes  Current Support Network: Lives with Spouse  Confirm Follow Up Transport: Self  The Plan for Transition of Care is Related to the Following Treatment Goals : Home with physician follow up vs [facility] and physician follow up   Discharge Location  Discharge Placement: Home with family assistance (vs )

## 2021-07-23 NOTE — PROGRESS NOTES
2795 Assumed care of the patient from Joshua Wiley RN (offgoing Nurse). 1900 Shift uneventful. 1920 Bedside and Verbal shift change report given to Joshua Wiley RN (oncoming nurse) by Dominique Allen RN (offgoing nurse). Report included the following information SBAR, Kardex, MAR, Recent Results and Cardiac Rhythm .

## 2021-07-23 NOTE — ED NOTES
TRANSFER - OUT REPORT:    Verbal report given to Celi SHEEHAN(name) on [address]  being transferred to tele(unit) for routine progression of care       Report consisted of patients Situation, Background, Assessment and   Recommendations(SBAR). Information from the following report(s) SBAR, ED Summary, [facility] and Recent Results was reviewed with the receiving nurse. Lines:   Peripheral IV 07/22/21 Right Antecubital (Active)   Site Assessment Clean, dry, & intact 07/22/21 1740   Phlebitis Assessment 0 07/22/21 1740   Infiltration Assessment 0 07/22/21 1740   Dressing Status Clean, dry, & intact 07/22/21 1740   Dressing Type Tape;Transparent 07/22/21 1740   Hub Color/Line Status Pink;Flushed 07/22/21 1740   Action Taken Blood drawn 07/22/21 1740        Opportunity for questions and clarification was provided.       Patient transported with:   Monitor, RN

## 2021-07-23 NOTE — PROGRESS NOTES
Reason for Admission: Hypoxia    Chart reviewed. Per H&P: \"Jcaey Crenshaw is a 54 y.o. female with obesity who is not vaccinated for Covid and tested positive last week at her PCP office presents via EMS for shortness of breath. Her son is on a travel baseball team and she was exposed from another child who was symptomatic while at an away game. Unfortunately, they were too busy with baseball over the summer to get the vaccine. Multiple family members of hers are also now Covid positive. She has had fever, congestion, myalgias, chest tightness, and cough. \"    Prior to admission patient was living:     Prior to admission patient was using the following DME:                     RUR Score: 7%                   Plan for utilizing home health: TBD, not likely         COVID Vaccine Status: Unvaccinated    PCP: First and Last name: Tiffanie Wilde MD    Name of Practice:    Are you a current patient: Yes/No:    Approximate date of last visit:    Can you participate in a virtual visit with your PCP:                     Current Advanced Directive/Advance Care Plan: Full Code no ACP docs on file    Healthcare Decision Maker:   Click here to complete [address] including selection of the Healthcare Decision Maker Relationship (ie \"Primary\")                         Transition of Care Plan: In progress

## 2021-07-24 LAB
ALBUMIN SERPL-MCNC: 2.6 G/DL (ref 3.4–5)
ALBUMIN/GLOB SERPL: 0.6 {RATIO} (ref 0.8–1.7)
ALP SERPL-CCNC: 112 U/L (ref 45–117)
ALT SERPL-CCNC: 97 U/L (ref 13–56)
ANION GAP SERPL CALC-SCNC: 5 MMOL/L (ref 3–18)
AST SERPL-CCNC: 74 U/L (ref 10–38)
BILIRUB SERPL-MCNC: 0.3 MG/DL (ref 0.2–1)
BUN SERPL-MCNC: 10 MG/DL (ref 7–18)
BUN/CREAT SERPL: 15 (ref 12–20)
CALCIUM SERPL-MCNC: 8.8 MG/DL (ref 8.5–10.1)
CHLORIDE SERPL-SCNC: 105 MMOL/L (ref 100–111)
CO2 SERPL-SCNC: 30 MMOL/L (ref 21–32)
CREAT SERPL-MCNC: 0.68 MG/DL (ref 0.6–1.3)
ERYTHROCYTE [DISTWIDTH] IN BLOOD BY AUTOMATED COUNT: 12.7 % (ref 11.6–14.5)
GLOBULIN SER CALC-MCNC: 4.2 G/DL (ref 2–4)
GLUCOSE SERPL-MCNC: 125 MG/DL (ref 74–99)
HCT VFR BLD AUTO: 41.7 % (ref 35–45)
HGB BLD-MCNC: 13.2 G/DL (ref 12–16)
MCH RBC QN AUTO: 28.6 PG (ref 24–34)
MCHC RBC AUTO-ENTMCNC: 31.7 G/DL (ref 31–37)
MCV RBC AUTO: 90.5 FL (ref 74–97)
PLATELET # BLD AUTO: 265 K/UL (ref 135–420)
PMV BLD AUTO: 10.4 FL (ref 9.2–11.8)
POTASSIUM SERPL-SCNC: 4.4 MMOL/L (ref 3.5–5.5)
PROT SERPL-MCNC: 6.8 G/DL (ref 6.4–8.2)
RBC # BLD AUTO: 4.61 M/UL (ref 4.2–5.3)
SODIUM SERPL-SCNC: 140 MMOL/L (ref 136–145)
WBC # BLD AUTO: 13 K/UL (ref 4.6–13.2)

## 2021-07-24 PROCEDURE — 74011250637 HC RX REV CODE- 250/637: Performed by: FAMILY MEDICINE

## 2021-07-24 PROCEDURE — 77010033678 HC OXYGEN DAILY

## 2021-07-24 PROCEDURE — 65660000000 HC RM CCU STEPDOWN

## 2021-07-24 PROCEDURE — 80053 COMPREHEN METABOLIC PANEL: CPT

## 2021-07-24 PROCEDURE — 74011250637 HC RX REV CODE- 250/637: Performed by: HOSPITALIST

## 2021-07-24 PROCEDURE — 85027 COMPLETE CBC AUTOMATED: CPT

## 2021-07-24 PROCEDURE — 74011250636 HC RX REV CODE- 250/636: Performed by: FAMILY MEDICINE

## 2021-07-24 PROCEDURE — 36415 COLL VENOUS BLD VENIPUNCTURE: CPT

## 2021-07-24 RX ADMIN — FAMOTIDINE 20 MG: 20 TABLET ORAL at 21:14

## 2021-07-24 RX ADMIN — Medication 5 MG: at 21:14

## 2021-07-24 RX ADMIN — DULOXETINE HYDROCHLORIDE 30 MG: 30 CAPSULE, DELAYED RELEASE ORAL at 08:18

## 2021-07-24 RX ADMIN — ENOXAPARIN SODIUM 30 MG: 40 INJECTION SUBCUTANEOUS at 08:18

## 2021-07-24 RX ADMIN — DEXAMETHASONE 6 MG: 4 TABLET ORAL at 08:18

## 2021-07-24 RX ADMIN — Medication 10 ML: at 14:00

## 2021-07-24 RX ADMIN — Medication 2000 UNITS: at 08:18

## 2021-07-24 RX ADMIN — Medication 500 MG: at 08:18

## 2021-07-24 RX ADMIN — ZINC SULFATE 220 MG (50 MG) CAPSULE 1 CAPSULE: CAPSULE at 08:18

## 2021-07-24 RX ADMIN — Medication 10 ML: at 21:14

## 2021-07-24 RX ADMIN — FAMOTIDINE 20 MG: 20 TABLET ORAL at 08:18

## 2021-07-24 RX ADMIN — ENOXAPARIN SODIUM 30 MG: 40 INJECTION SUBCUTANEOUS at 21:13

## 2021-07-24 NOTE — PROGRESS NOTES
6172 Assumed care of the patient from Radha Mcknight RN (offgoing Nurse). Patient is alert and oriented. Pt denies any pain or discomfort at this moment. bed in low position, call bell within reach. 1900 Shift uneventful. 1910 Bedside and Verbal shift change report given to Radha Mcknight RN (oncoming nurse) by Nayeli Pritchett RN (offgoing nurse). Report included the following information SBAR, Kardex, MAR, Recent Results and Cardiac Rhythm .

## 2021-07-24 NOTE — PROGRESS NOTES
Hospitalist Progress Note    Patient: Jackie Main MRN: [id number]  CSN: [id number]    YOB: 1975  Age: 54 y.o. Sex: female    DOA: 7/22/2021 LOS:  LOS: 2 days                Assessment and Plan:    Principal Problem:    Hypoxia (7/22/2021)    Active Problems:    Pneumonia due to COVID-19 virus (7/22/2021)      Obesity (BMI 30-39.9) (7/22/2021)        COVID 19 pneumonia - On oxygen by NC  Wean as tolerated  Incentive spirometry  Dexamethasone day  3/10    lovenox prophylaxus          Chief complaint:  50-year-old female with obesity who is unvaccinated for Covid is admitted for pneumonia due to COVID-19 and hypoxia. Subjective:    Feels a little better      Review of systems:    General: No fevers or chills. Cardiovascular: No chest pain or pressure. No palpitations. Pulmonary: No shortness of breath. Gastrointestinal: No nausea, vomiting. Objective:    Vital signs/Intake and Output:  Visit Vitals  /85   Pulse (!) 55   Temp 98 °F (36.7 °C)   Resp 16   Wt 93.1 kg (205 lb 4.8 oz)   SpO2 92%     Current Shift:  No intake/output data recorded. Last three shifts:  07/22 1901 - 07/24 0700  In: 960 [P.O.:960]  Out: 1600 [Urine:1600]    Physical Exam:  General: NAD, AAOx3. Non-toxic. HEENT: NC/AT. PERRLA, EOMI.  MMM. Lungs: Nml inspection. CTA B/L. No wheezing, rales or rhonchi. Heart:  S1S2 RRR,  PMI mid 5th IC space. No M/RG. Abdomen: Soft, NT/ND.  BS+. No peritoneal signs. Extremities: No C/C/E. Psych:   Nml affect. Neurologic:  2-12 intact. Strength 5/5 throughout.   Sensation symmetrical.          Labs: Results:       Chemistry Recent Labs     07/24/21  0505 07/23/21  0655 07/22/21  1750   * 152* 102*    141 142   K 4.4 3.9 4.0    107 104   CO2 30 29 29   BUN 10 7 8   CREA 0.68 0.49* 0.58*   CA 8.8 8.9 8.7   AGAP 5 5 9   BUCR 15 14 14    113 124*   TP 6.8 7.3 8.0   ALB 2.6* 2.7* 3.0*   GLOB 4.2* 4.6* 5.0*   AGRAT 0.6* 0.6* 0.6*      CBC w/Diff Recent Labs     07/24/21  0505 07/23/21  0655 07/22/21  1750   WBC 13.0 5.1 7.6   RBC 4.61 4.74 5.16   HGB 13.2 13.8 15.0   HCT 41.7 42.5 46.9*    247 263   GRANS  --   --  76*   LYMPH  --   --  14*   EOS  --   --  0      Cardiac Enzymes Recent Labs     07/22/21  1750   CPK 51   CKND1 CALCULATION NOT PERFORMED WHEN RESULT IS BELOW LINEAR LIMIT      Coagulation No results for input(s): PTP, INR, APTT, INREXT in the last 72 hours. Lipid Panel Lab Results   Component Value Date/Time    Cholesterol, total 183 07/23/2021 06:55 AM    HDL Cholesterol 47 07/23/2021 06:55 AM    LDL, calculated 87.6 07/23/2021 06:55 AM    VLDL, calculated 48.4 07/23/2021 06:55 AM    Triglyceride 242 (H) 07/23/2021 06:55 AM    CHOL/HDL Ratio 3.9 07/23/2021 06:55 AM      BNP No results for input(s): BNPP in the last 72 hours.    Liver Enzymes Recent Labs     07/24/21  0505   TP 6.8   ALB 2.6*         Thyroid Studies No results found for: T4, T3U, TSH, TSHEXT     Procedures/imaging: see electronic medical records for all procedures/Xrays and details which were not copied into this note but were reviewed prior to creation of Plan

## 2021-07-24 NOTE — PROGRESS NOTES
Problem: Airway Clearance - Ineffective  Goal: Achieve or maintain patent airway  Outcome: Progressing Towards Goal     Problem: Gas Exchange - Impaired  Goal: Absence of hypoxia  Outcome: Progressing Towards Goal  Goal: Promote optimal lung function  Outcome: Progressing Towards Goal     Problem: Breathing Pattern - Ineffective  Goal: Ability to achieve and maintain a regular respiratory rate  Outcome: Progressing Towards Goal     Problem:  Body Temperature -  Risk of, Imbalanced  Goal: Ability to maintain a body temperature within defined limits  Outcome: Progressing Towards Goal  Goal: Will regain or maintain usual level of consciousness  Outcome: Progressing Towards Goal  Goal: Complications related to the disease process, condition or treatment will be avoided or minimized  Outcome: Progressing Towards Goal     Problem: Risk for Fluid Volume Deficit  Goal: Maintain normal heart rhythm  Outcome: Progressing Towards Goal  Goal: Maintain absence of muscle cramping  Outcome: Progressing Towards Goal  Goal: Maintain normal serum potassium, sodium, calcium, phosphorus, and pH  Outcome: Progressing Towards Goal

## 2021-07-24 NOTE — PROGRESS NOTES
Hospitalist Progress Note    Patient: Luda Judd MRN: [id number]  CSN: [id number]    YOB: 1975  Age: 54 y.o. Sex: female    DOA: 7/22/2021 LOS:  LOS: 1 day                Assessment/Plan     Patient Active Problem List   Diagnosis Code    Hypoxia R09.02    Pneumonia due to COVID-19 virus U07.1, J12.82    Obesity (BMI 30-39. 9) E66.9            65-year-old female with obesity who is unvaccinated for Covid is admitted for pneumonia due to COVID-19 and hypoxia. COVID 19 pneumonia -  On oxygen by NC  Wean as tolerated  Incentive spirometry  Dexamethasone  lovenox  Follow labs        Disposition : TBD    Review of systems  General: No fevers or chills. Cardiovascular: No chest pain or pressure. No palpitations. Pulmonary: No shortness of breath. Gastrointestinal: No nausea, vomiting. Physical Exam:  General: Awake, cooperative, no acute distress    HEENT: NC, Atraumatic. PERRLA, anicteric sclerae. Lungs: CTA Bilaterally. No Wheezing/Rhonchi/Rales. Heart:  S1 S2,  No murmur, No Rubs, No Gallops  Abdomen: Soft, Non distended, Non tender.  +Bowel sounds,   Extremities: No c/c/e  Psych:   Not anxious or agitated. Neurologic:  No acute neurological deficit. Vital signs/Intake and Output:  Visit Vitals  /70   Pulse 71   Temp 98.2 °F (36.8 °C)   Resp 18   Wt 93.1 kg (205 lb 4.8 oz)   SpO2 91%     Current Shift:  No intake/output data recorded.   Last three shifts:  07/22 0701 - 07/23 1900  In: 1960 [P.O.:960; I.V.:1000]  Out: 1600 [Urine:1600]            Labs: Results:       Chemistry Recent Labs     07/23/21  0655 07/22/21  1750   * 102*    142   K 3.9 4.0    104   CO2 29 29   BUN 7 8   CREA 0.49* 0.58*   CA 8.9 8.7   AGAP 5 9   BUCR 14 14    124*   TP 7.3 8.0   ALB 2.7* 3.0*   GLOB 4.6* 5.0*   AGRAT 0.6* 0.6*      CBC w/Diff Recent Labs     07/23/21  0655 07/22/21  1750   WBC 5.1 7.6   RBC 4.74 5.16   HGB 13.8 15.0   HCT 42.5 46.9*  263   GRANS  --  76*   LYMPH  --  14*   EOS  --  0      Cardiac Enzymes Recent Labs     07/22/21  1750   CPK 51   CKND1 CALCULATION NOT PERFORMED WHEN RESULT IS BELOW LINEAR LIMIT      Coagulation No results for input(s): PTP, INR, APTT, INREXT in the last 72 hours. Lipid Panel Lab Results   Component Value Date/Time    Cholesterol, total 183 07/23/2021 06:55 AM    HDL Cholesterol 47 07/23/2021 06:55 AM    LDL, calculated 87.6 07/23/2021 06:55 AM    VLDL, calculated 48.4 07/23/2021 06:55 AM    Triglyceride 242 (H) 07/23/2021 06:55 AM    CHOL/HDL Ratio 3.9 07/23/2021 06:55 AM      BNP No results for input(s): BNPP in the last 72 hours.    Liver Enzymes Recent Labs     07/23/21  0655   TP 7.3   ALB 2.7*         Thyroid Studies No results found for: T4, T3U, TSH, TSHEXT     Procedures/imaging: see electronic medical records for all procedures/Xrays and details which were not copied into this note but were reviewed prior to creation of Plan

## 2021-07-25 LAB
ALBUMIN SERPL-MCNC: 2.7 G/DL (ref 3.4–5)
ALBUMIN/GLOB SERPL: 0.6 {RATIO} (ref 0.8–1.7)
ALP SERPL-CCNC: 103 U/L (ref 45–117)
ALT SERPL-CCNC: 97 U/L (ref 13–56)
ANION GAP SERPL CALC-SCNC: 6 MMOL/L (ref 3–18)
AST SERPL-CCNC: 44 U/L (ref 10–38)
BILIRUB SERPL-MCNC: 0.3 MG/DL (ref 0.2–1)
BUN SERPL-MCNC: 11 MG/DL (ref 7–18)
BUN/CREAT SERPL: 17 (ref 12–20)
CALCIUM SERPL-MCNC: 8.6 MG/DL (ref 8.5–10.1)
CHLORIDE SERPL-SCNC: 106 MMOL/L (ref 100–111)
CO2 SERPL-SCNC: 30 MMOL/L (ref 21–32)
CREAT SERPL-MCNC: 0.66 MG/DL (ref 0.6–1.3)
ERYTHROCYTE [DISTWIDTH] IN BLOOD BY AUTOMATED COUNT: 12.5 % (ref 11.6–14.5)
GLOBULIN SER CALC-MCNC: 4.2 G/DL (ref 2–4)
GLUCOSE SERPL-MCNC: 112 MG/DL (ref 74–99)
HCT VFR BLD AUTO: 41.1 % (ref 35–45)
HGB BLD-MCNC: 13.3 G/DL (ref 12–16)
MCH RBC QN AUTO: 29.2 PG (ref 24–34)
MCHC RBC AUTO-ENTMCNC: 32.4 G/DL (ref 31–37)
MCV RBC AUTO: 90.1 FL (ref 74–97)
PLATELET # BLD AUTO: 284 K/UL (ref 135–420)
PMV BLD AUTO: 9.7 FL (ref 9.2–11.8)
POTASSIUM SERPL-SCNC: 3.5 MMOL/L (ref 3.5–5.5)
PROT SERPL-MCNC: 6.9 G/DL (ref 6.4–8.2)
RBC # BLD AUTO: 4.56 M/UL (ref 4.2–5.3)
SODIUM SERPL-SCNC: 142 MMOL/L (ref 136–145)
WBC # BLD AUTO: 12.5 K/UL (ref 4.6–13.2)

## 2021-07-25 PROCEDURE — 74011250637 HC RX REV CODE- 250/637: Performed by: FAMILY MEDICINE

## 2021-07-25 PROCEDURE — 85027 COMPLETE CBC AUTOMATED: CPT

## 2021-07-25 PROCEDURE — 74011250637 HC RX REV CODE- 250/637: Performed by: HOSPITALIST

## 2021-07-25 PROCEDURE — 80053 COMPREHEN METABOLIC PANEL: CPT

## 2021-07-25 PROCEDURE — 36415 COLL VENOUS BLD VENIPUNCTURE: CPT

## 2021-07-25 PROCEDURE — 74011250636 HC RX REV CODE- 250/636: Performed by: FAMILY MEDICINE

## 2021-07-25 PROCEDURE — 65660000000 HC RM CCU STEPDOWN

## 2021-07-25 PROCEDURE — 77010033678 HC OXYGEN DAILY

## 2021-07-25 RX ADMIN — DULOXETINE HYDROCHLORIDE 30 MG: 30 CAPSULE, DELAYED RELEASE ORAL at 09:15

## 2021-07-25 RX ADMIN — Medication 500 MG: at 09:15

## 2021-07-25 RX ADMIN — DEXAMETHASONE 6 MG: 4 TABLET ORAL at 09:15

## 2021-07-25 RX ADMIN — Medication 5 MG: at 21:33

## 2021-07-25 RX ADMIN — Medication 10 ML: at 16:00

## 2021-07-25 RX ADMIN — Medication 10 ML: at 21:33

## 2021-07-25 RX ADMIN — ENOXAPARIN SODIUM 30 MG: 40 INJECTION SUBCUTANEOUS at 09:15

## 2021-07-25 RX ADMIN — FAMOTIDINE 20 MG: 20 TABLET ORAL at 21:33

## 2021-07-25 RX ADMIN — Medication 2000 UNITS: at 09:15

## 2021-07-25 RX ADMIN — FAMOTIDINE 20 MG: 20 TABLET ORAL at 09:15

## 2021-07-25 RX ADMIN — ZINC SULFATE 220 MG (50 MG) CAPSULE 1 CAPSULE: CAPSULE at 09:15

## 2021-07-25 RX ADMIN — ENOXAPARIN SODIUM 30 MG: 40 INJECTION SUBCUTANEOUS at 21:33

## 2021-07-25 NOTE — PROGRESS NOTES
Problem: Airway Clearance - Ineffective  Goal: Achieve or maintain patent airway  Outcome: Progressing Towards Goal     Problem: Gas Exchange - Impaired  Goal: Absence of hypoxia  Outcome: Progressing Towards Goal  Goal: Promote optimal lung function  Outcome: Progressing Towards Goal     Problem: Breathing Pattern - Ineffective  Goal: Ability to achieve and maintain a regular respiratory rate  Outcome: Progressing Towards Goal     Problem: Body Temperature -  Risk of, Imbalanced  Goal: Ability to maintain a body temperature within defined limits  Outcome: Progressing Towards Goal  Goal: Will regain or maintain usual level of consciousness  Outcome: Progressing Towards Goal  Goal: Complications related to the disease process, condition or treatment will be avoided or minimized  Outcome: Progressing Towards Goal     Problem: Isolation Precautions - Risk of Spread of Infection  Goal: Prevent transmission of infectious organism to others  Outcome: Progressing Towards Goal     Problem: Risk for Fluid Volume Deficit  Goal: Maintain normal heart rhythm  Outcome: Progressing Towards Goal     Problem: Falls - Risk of  Goal: *Absence of Falls  Description: Document [place] Fall Risk and appropriate interventions in the flowsheet.   Outcome: Progressing Towards Goal  Note: Fall Risk Interventions:            Medication Interventions: Patient to call before getting OOB, Teach patient to arise slowly

## 2021-07-25 NOTE — PROGRESS NOTES
0715 Bedside and Verbal shift change report given to YOLANDA Guallpa RN (oncoming nurse) by Cherise Santos. Ángel Hathaway (offgoing nurse). Report included the following information SBAR, Kardex, Intake/Output, and MAR. Pt in bed, call light in reach. 0913 Pt assessed. No signs of acute distress. Pt in bed. Pt had c/o nausea, but did not want Zofran, pt will call if she wants it. 1215 Pt assessed. No signs of acute distress. Pt in bed. Pt weaned to 1L NC    1600 Pt assessed. No signs of acute distress. Pt in bed. 1910 Bedside and Verbal shift change report given to ANNETTE Booth Cap, RN (oncoming nurse) by [facility]. Pam Guallpa RN (offgoing nurse). Report included the following information SBAR, Kardex, Intake/Output and MAR. Pt in bed, call light in reach. Shift uneventful, pt weaned to 1 L. Pt states she has sleep apnea and wears CPAP at home.

## 2021-07-26 VITALS
HEIGHT: 62 IN | WEIGHT: 205.3 LBS | BODY MASS INDEX: 37.78 KG/M2 | HEART RATE: 50 BPM | TEMPERATURE: 98 F | DIASTOLIC BLOOD PRESSURE: 83 MMHG | RESPIRATION RATE: 16 BRPM | OXYGEN SATURATION: 94 % | SYSTOLIC BLOOD PRESSURE: 136 MMHG

## 2021-07-26 PROCEDURE — 74011250637 HC RX REV CODE- 250/637: Performed by: HOSPITALIST

## 2021-07-26 PROCEDURE — 74011250637 HC RX REV CODE- 250/637: Performed by: FAMILY MEDICINE

## 2021-07-26 PROCEDURE — 74011250636 HC RX REV CODE- 250/636: Performed by: FAMILY MEDICINE

## 2021-07-26 RX ORDER — DEXAMETHASONE 6 MG/1
6 TABLET ORAL
Qty: 6 TABLET | Refills: 0 | Status: SHIPPED | OUTPATIENT
Start: 2021-07-26 | End: 2021-08-01

## 2021-07-26 RX ADMIN — ENOXAPARIN SODIUM 30 MG: 40 INJECTION SUBCUTANEOUS at 08:31

## 2021-07-26 RX ADMIN — Medication 10 ML: at 05:10

## 2021-07-26 RX ADMIN — DEXAMETHASONE 6 MG: 4 TABLET ORAL at 08:31

## 2021-07-26 RX ADMIN — FAMOTIDINE 20 MG: 20 TABLET ORAL at 08:31

## 2021-07-26 RX ADMIN — DULOXETINE HYDROCHLORIDE 30 MG: 30 CAPSULE, DELAYED RELEASE ORAL at 08:32

## 2021-07-26 RX ADMIN — Medication 2000 UNITS: at 08:31

## 2021-07-26 RX ADMIN — ZINC SULFATE 220 MG (50 MG) CAPSULE 1 CAPSULE: CAPSULE at 08:31

## 2021-07-26 RX ADMIN — Medication 500 MG: at 08:32

## 2021-07-26 NOTE — DISCHARGE SUMMARY
Discharge Summary    Patient: Alex Bright MRN: [id number]  CSN: [id number]    YOB: 1975  Age: 54 y.o. Sex: female    DOA: 7/22/2021 LOS:  LOS: 4 days   Discharge Date: 7/26/2021     Primary Care Provider:  Barbie Real MD    Admission Diagnoses: Pneumonia due to COVID-19 virus [U07.1, J12.82]  Hypoxia [R09.02]    Discharge Diagnoses:    Problem List as of 7/26/2021 Date Reviewed: 7/22/2021        Codes Class Noted - Resolved    * (Principal) Hypoxia ICD-10-CM: R09.02  ICD-9-CM: 799.02  7/22/2021 - Present        Pneumonia due to COVID-19 virus ICD-10-CM: U07.1, J12.82  ICD-9-CM: 480.8, 079.89  7/22/2021 - Present        Obesity (BMI 30-39. 9) ICD-10-CM: E66.9  ICD-9-CM: 278.00  7/22/2021 - Present              Discharge Medications:     Discharge Medication List as of 7/26/2021  3:03 PM      START taking these medications    Details   dexAMETHasone (DECADRON) 6 mg tablet Take 1 Tablet by mouth Daily (before breakfast) for 6 days. , Normal, Disp-6 Tablet, R-0         CONTINUE these medications which have NOT CHANGED    Details   DULoxetine (Cymbalta) 30 mg capsule Take 30 mg by mouth daily. , Historical Med      aspirin delayed-release 81 mg tablet Take 81 mg by mouth daily. , Historical Med             Discharge Condition: Good    Procedures : None    Consults: None      PHYSICAL EXAM   Visit Vitals  /83   Pulse (!) 50   Temp 98 °F (36.7 °C)   Resp 16   Ht 5' 2\" (1.575 m)   Wt 93.1 kg (205 lb 4.8 oz)   SpO2 94%   BMI 37.55 kg/m²     General: Awake, cooperative, no acute distress    HEENT: NC, Atraumatic. PERRLA, EOMI. Anicteric sclerae. Lungs:  CTA Bilaterally. No Wheezing/Rhonchi/Rales. Heart:  Regular  rhythm,  No murmur, No Rubs, No Gallops  Abdomen: Soft, Non distended, Non tender. +Bowel sounds,   Extremities: No c/c/e  Psych:   Not anxious or agitated. Neurologic:  No acute neurological deficits.                                      Admission HPI :   Alex Bright is a 54 y.o. female with obesity who is not vaccinated for Covid and tested positive last week at her PCP office presents via EMS for shortness of breath. Her son is on a travel baseball team and she was exposed from another child who was symptomatic while at an away game. Unfortunately, they were too busy with baseball over the summer to get the vaccine. Multiple family members of hers are also now Covid positive. She has had fever, congestion, myalgias, chest tightness, and cough. Hospital Course :   Ms. Jane Weldon was admitted to [place] [address] isolation room. She was started on vitamin/antioxidant cocktail, dexamethasone. Not a candidate for remdisivir as she is over 7 days of illness. Initially she required 2 L of oxygen by NC. This was weaned off. She is now saturating above 94% on RA. Will discharge on dexamethasone to complete 10 day course. Advised quarantine. Activity: Activity as tolerated    Diet: regular diet. Follow-up: PCP    Disposition: home    Minutes spent on discharge: 45       Labs: Results:       Chemistry Recent Labs     07/25/21  0550 07/24/21  0505   * 125*    140   K 3.5 4.4    105   CO2 30 30   BUN 11 10   CREA 0.66 0.68   CA 8.6 8.8   AGAP 6 5   BUCR 17 15    112   TP 6.9 6.8   ALB 2.7* 2.6*   GLOB 4.2* 4.2*   AGRAT 0.6* 0.6*      CBC w/Diff Recent Labs     07/25/21  0550 07/24/21  0505   WBC 12.5 13.0   RBC 4.56 4.61   HGB 13.3 13.2   HCT 41.1 41.7    265      Cardiac Enzymes No results for input(s): CPK, CKND1, BEAU in the last 72 hours. No lab exists for component: CKRMB, TROIP   Coagulation No results for input(s): PTP, INR, APTT, INREXT in the last 72 hours.     Lipid Panel Lab Results   Component Value Date/Time    Cholesterol, total 183 07/23/2021 06:55 AM    HDL Cholesterol 47 07/23/2021 06:55 AM    LDL, calculated 87.6 07/23/2021 06:55 AM    VLDL, calculated 48.4 07/23/2021 06:55 AM    Triglyceride 242 (H) 07/23/2021 06:55 AM    CHOL/HDL Ratio 3.9 07/23/2021 06:55 AM      BNP No results for input(s): BNPP in the last 72 hours. Liver Enzymes Recent Labs     07/25/21  0550   TP 6.9   ALB 2.7*         Thyroid Studies No results found for: T4, T3U, TSH, TSHEXT         Significant Diagnostic Studies: CTA CHEST W OR W WO CONT    Result Date: 7/22/2021  EXAM: CTA chest INDICATION: Hypoxia Covid positive COMPARISON: None TECHNIQUE: Axial CT imaging from the thoracic inlet through the diaphragm with intravenous contrast. Coronal and sagittal MIP reformats were generated. One or more dose reduction techniques were used on this CT: automated exposure control, adjustment of the mAs and/or kVp according to patient size, and iterative reconstruction techniques. The specific techniques used on this CT exam have been documented in the patient's electronic medical record. Digital Imaging and Communications in Medicine (DICOM) format image data are available to nonaffiliated external healthcare facilities or entities on a secure, media free, reciprocally searchable basis with patient authorization for at least a 12-month period after this study. _______________ FINDINGS: EXAM QUALITY: Overall exam quality is satisfactory. Pulmonary arterial enhancement is adequate with adequate breath hold and no significant artifact. PULMONARY ARTERIES: No evidence of pulmonary embolism. LYMPH Nodes: No enlarged lymph nodes seen. PLEURA: No pleural effusion seen. HEART: Normal in size without pericardial effusion. VASCULATURE/MEDIASTINUM: There is no aortic dissection. Small hiatal hernia present. LUNGS: There are diffuse bilateral patchy infiltrates and groundglass densities suggesting atypical pneumonia. The findings are typical in appearance for Covid 19 pneumonia. AIRWAY: Normal. UPPER ABDOMEN: Unremarkable. OTHER: No acute or aggressive osseous abnormalities identified. _______________     No evidence of a pulmonary embolism or aortic dissection.  Diffuse patchy infiltrates and groundglass densities bilaterally suggesting atypical pneumonia. The findings are typical in appearance for Covid 19 pneumonia. XR CHEST PORT    Result Date: 7/22/2021  EXAM:  AP Portable Chest X-ray 1 view INDICATION: Shortness of breath Covid positive cough COMPARISON: None _______________ FINDINGS:  Heart and mediastinal contours are within normal limits for portable radiograph. There are patchy alveolar and interstitial infiltrates of both lung bases left greater than right. . There are no pleural effusions. No acute osseous findings. ________________      Patchy interstitial and alveolar infiltrates left greater than right. No results found for this or any previous visit. Please note that this dictation was completed with [employer], the computer voice recognition software. Quite often unanticipated grammatical, syntax, homophones, and other interpretive errors are inadvertently transcribed by the computer software. Please disregard these errors. Please excuse any errors that have escaped final proofreading.      CC: Viv Trotter MD

## 2021-07-26 NOTE — DISCHARGE INSTRUCTIONS
DISCHARGE SUMMARY from Nurse    PATIENT INSTRUCTIONS:    After general anesthesia or intravenous sedation, for 24 hours or while taking prescription Narcotics:  · Limit your activities  · Do not drive and operate hazardous machinery  · Do not make important personal or business decisions  · Do  not drink alcoholic beverages  · If you have not urinated within 8 hours after discharge, please contact your surgeon on call. Report the following to your surgeon:  · Excessive pain, swelling, redness or odor of or around the surgical area  · Temperature over 100.5  · Nausea and vomiting lasting longer than 4 hours or if unable to take medications  · Any signs of decreased circulation or nerve impairment to extremity: change in color, persistent  numbness, tingling, coldness or increase pain  · Any questions    What to do at Home:  Recommended activity: Activity as tolerated,     If you experience any of the following symptoms , please follow up with . *  Please give a list of your current medications to your Primary Care Provider. *  Please update this list whenever your medications are discontinued, doses are      changed, or new medications (including over-the-counter products) are added. *  Please carry medication information at all times in case of emergency situations. These are general instructions for a healthy lifestyle:    No smoking/ No tobacco products/ Avoid exposure to second hand smoke  Surgeon General's Warning:  Quitting smoking now greatly reduces serious risk to your health.     Obesity, smoking, and sedentary lifestyle greatly increases your risk for illness    A healthy diet, regular physical exercise & weight monitoring are important for maintaining a healthy lifestyle    You may be retaining fluid if you have a history of heart failure or if you experience any of the following symptoms:  Weight gain of 3 pounds or more overnight or 5 pounds in a week, increased swelling in our hands or feet or shortness of breath while lying flat in bed. Please call your doctor as soon as you notice any of these symptoms; do not wait until your next office visit. The discharge information has been reviewed with the patient. The patient verbalized understanding. Discharge medications reviewed with the patient  Patient Education        Learning About Coronavirus [phone])  What is coronavirus (COVID-19)? COVID-19 is a disease caused by a new type of coronavirus. This illness was first found in December 2019. It has since spread worldwide. Coronaviruses are a large group of viruses. They cause the common cold. They also cause more serious illnesses like [place] East respiratory syndrome (MERS) and severe acute respiratory syndrome (SARS). COVID-19 is caused by a novel coronavirus. That means it's a new type that has not been seen in people before. What are the symptoms? Coronavirus (COVID-19) symptoms may include:  · Fever. · Cough. · Trouble breathing. · Chills or repeated shaking with chills. · Muscle pain. · Headache. · Sore throat. · New loss of taste or smell. · Vomiting. · Diarrhea. In severe cases, COVID-19 can cause pneumonia and make it hard to breathe without help from a machine. It can cause death. How is it diagnosed? COVID-19 is diagnosed with a viral test. This may also be called a PCR test or antigen test. It looks for evidence of the virus in your breathing passages or lungs (respiratory system). The test is most often done on a sample from the nose, throat, or lungs. It's sometimes done on a sample of saliva. One way a sample is collected is by putting a long swab into the back of your nose. How is it treated? Mild cases of COVID-19 can be treated at home. Serious cases need treatment in the hospital. Treatment may include medicines to reduce symptoms, plus breathing support such as oxygen therapy or a ventilator.  Some people may be placed on their belly to help their oxygen levels. Treatments that may help people who have COVID-19 include:  Antiviral medicines. These medicines treat viral infections. Remdesivir is an example. Immune-based therapy. These medicines help the immune system fight COVID-19. One example is bamlanivimab. It's a monoclonal antibody. Blood thinners. These medicines help prevent blood clots. People with severe illness are at risk for blood clots. How can you protect yourself and others? The best way to protect yourself from getting sick is to:  · Avoid areas where there is an outbreak. · Avoid contact with people who may be infected. · Avoid crowds and try to stay at least 6 feet away from other people. · Wash your hands often, especially after you cough or sneeze. Use soap and water, and scrub for at least 20 seconds. If soap and water aren't available, use an alcohol-based hand . · Avoid touching your mouth, nose, and eyes. To help avoid spreading the virus to others:  · Stay home if you are sick or have been exposed to the virus. Don't go to school, work, or public areas. And don't use public transportation, ride-shares, or taxis unless you have no choice. · Wear a cloth face cover if you have to go to public areas. · Cover your mouth with a tissue when you cough or sneeze. Then throw the tissue in the trash and wash your hands right away. · If you're sick:  ? Leave your home only if you need to get medical care. But call the doctor's office first so they know you're coming. And wear a face cover. ? Wear the face cover whenever you're around other people. It can help stop the spread of the virus when you cough or sneeze. ? Limit contact with pets and people in your home. If possible, stay in a separate bedroom and use a separate bathroom. ? Clean and disinfect your home every day. Use household  and disinfectant wipes or sprays. Take special care to clean things that you grab with your hands.  These include doorknobs, remote controls, phones, and handles on your refrigerator and microwave. And don't forget countertops, tabletops, bathrooms, and computer keyboards. When should you call for help? Call 911 anytime you think you may need emergency care. For example, call if you have life-threatening symptoms, such as:    · You have severe trouble breathing. (You can't talk at all.)     · You have constant chest pain or pressure.     · You are severely dizzy or lightheaded.     · You are confused or can't think clearly.     · Your face and lips have a blue color.     · You pass out (lose consciousness) or are very hard to wake up. Call your doctor now or seek immediate medical care if:    · You have moderate trouble breathing. (You can't speak a full sentence.)     · You are coughing up blood (more than about 1 teaspoon).     · You have signs of low blood pressure. These include feeling lightheaded; being too weak to stand; and having cold, pale, clammy skin. Watch closely for changes in your health, and be sure to contact your doctor if:    · Your symptoms get worse.     · You are not getting better as expected. Call before you go to the doctor's office. Follow their instructions. And wear a cloth face cover. Current as of: December 18, 2020               Content Version: 12.8  © 2006-2021 [employer]. Care instructions adapted under license by [employer] (which disclaims liability or warranty for this information). If you have questions about a medical condition or this instruction, always ask your healthcare professional. [address] any warranty or liability for your use of this information. and appropriate educational materials and side effects teaching were provided.   Patient armband removed and shredded    ___________________________________________________________________________________________________________________________________

## 2021-07-26 NOTE — PROGRESS NOTES
44 [facility] care of the patient from [place] [address] (offgoing Nurse). Patient is alert and oriented. Pt denies any pain or discomfort at this moment. bed in low position, call bell within reach. 0830 Shift assessment complete. Pt weaned off the oxygen. Spo2 90-95 on RA. Will continue to monitor. 1000 Pt spo2 dropped to 86-88. Checked on pt. Pt sleeping. Pt states she has sleep apnea. Pt spo2 while awake is 90-95 on RA    1555 Discharge instructions reviewed with the patient. Patient verbalized understanding and verified by teach back. All questions answered. IV discontinued, no redness, swelling or pain noted. Patient discharged off the unit via wheelchair. Patient armband removed and shredded.

## 2021-07-26 NOTE — PROGRESS NOTES
Problem: Airway Clearance - Ineffective  Goal: Achieve or maintain patent airway  Outcome: Progressing Towards Goal     Problem: Gas Exchange - Impaired  Goal: Absence of hypoxia  Outcome: Progressing Towards Goal  Goal: Promote optimal lung function  Outcome: Progressing Towards Goal     Problem: Breathing Pattern - Ineffective  Goal: Ability to achieve and maintain a regular respiratory rate  Outcome: Progressing Towards Goal     Problem:  Body Temperature -  Risk of, Imbalanced  Goal: Ability to maintain a body temperature within defined limits  Outcome: Progressing Towards Goal  Goal: Will regain or maintain usual level of consciousness  Outcome: Progressing Towards Goal  Goal: Complications related to the disease process, condition or treatment will be avoided or minimized  Outcome: Progressing Towards Goal     Problem: Risk for Fluid Volume Deficit  Goal: Maintain normal heart rhythm  Outcome: Progressing Towards Goal  Goal: Maintain absence of muscle cramping  Outcome: Progressing Towards Goal Fluence Units: J/cm2

## 2021-07-26 NOTE — PROGRESS NOTES
Problem: Airway Clearance - Ineffective  Goal: Achieve or maintain patent airway  Outcome: Progressing Towards Goal     Problem: Gas Exchange - Impaired  Goal: Absence of hypoxia  Outcome: Progressing Towards Goal  Goal: Promote optimal lung function  Outcome: Progressing Towards Goal     Problem: Breathing Pattern - Ineffective  Goal: Ability to achieve and maintain a regular respiratory rate  Outcome: Progressing Towards Goal     Problem: Body Temperature -  Risk of, Imbalanced  Goal: Ability to maintain a body temperature within defined limits  Outcome: Progressing Towards Goal  Goal: Will regain or maintain usual level of consciousness  Outcome: Progressing Towards Goal  Goal: Complications related to the disease process, condition or treatment will be avoided or minimized  Outcome: Progressing Towards Goal     Problem: Isolation Precautions - Risk of Spread of Infection  Goal: Prevent transmission of infectious organism to others  Outcome: Progressing Towards Goal     Problem: Nutrition Deficits  Goal: Optimize nutrtional status  Outcome: Progressing Towards Goal     Problem: Risk for Fluid Volume Deficit  Goal: Maintain normal heart rhythm  Outcome: Progressing Towards Goal  Goal: Maintain absence of muscle cramping  Outcome: Progressing Towards Goal  Goal: Maintain normal serum potassium, sodium, calcium, phosphorus, and pH  Outcome: Progressing Towards Goal     Problem: Loneliness or Risk for Loneliness  Goal: Demonstrate positive use of time alone when socialization is not possible  Outcome: Progressing Towards Goal     Problem: Fatigue  Goal: Verbalize increase energy and improved vitality  Outcome: Progressing Towards Goal     Problem: Patient Education: Go to Patient Education Activity  Goal: Patient/Family Education  Outcome: Progressing Towards Goal     Problem: Falls - Risk of  Goal: *Absence of Falls  Description: Document Ariel Fall Risk and appropriate interventions in the flowsheet.   Outcome: Progressing Towards Goal  Note: Fall Risk Interventions:            Medication Interventions: Patient to call before getting OOB, Teach patient to arise slowly                   Problem: Patient Education: Go to Patient Education Activity  Goal: Patient/Family Education  Outcome: Progressing Towards Goal

## 2021-07-26 NOTE — PROGRESS NOTES
Chart reviewed for d/c planning,pt on RA at this time, cm will cont to review and remain available x [phone].

## 2021-07-27 ENCOUNTER — PATIENT OUTREACH (OUTPATIENT)
Dept: CASE MANAGEMENT | Age: 46
End: 2021-07-27

## 2021-07-27 NOTE — PROGRESS NOTES
Care Transitions Nurse ( CTN) attempted to contact patient  regarding COVID-19/hospital discharge follow up. There was no answer. CTN left a voicemail message requesting a non-emergency return telephone call.

## 2021-07-28 ENCOUNTER — PATIENT OUTREACH (OUTPATIENT)
Dept: CASE MANAGEMENT | Age: 46
End: 2021-07-28

## 2021-07-28 LAB
BACTERIA SPEC CULT: NORMAL
SERVICE CMNT-IMP: NORMAL

## 2021-07-28 NOTE — PROGRESS NOTES
Care Transitions Initial Call    Call within 2 business days of discharge: Yes     Patient: Luc Jamison Patient : 1975 MRN: [id number]    Last Discharge [address]       Complaint Diagnosis Description Type Department Provider    21 Positive For Covid-19 Pneumonia due to COVID-19 virus . .. ED to Hosp-Admission (Discharged) (ADMIT) Annamaria Alonzo MD; Milagro Yoo, ... Was this an external facility discharge? No Discharge Facility: [facility]     Care Transitions Nurse ( CTN) attempted to contact patient via telephone call regarding hospital discharge and Transition of Care follow up. There was no response. A voicemail message was left requesting a non-emergency return telephone call. CTN  contact information provided. Missed incoming phone call from patient's phone number noted by CTN. CTN returned phone call to patient and there was no answer.    CTN left a message that this was not an emergency phone call and that CTN would return phone call at a later date or patient is welcome to return phone call to CTN

## 2021-08-06 ENCOUNTER — PATIENT OUTREACH (OUTPATIENT)
Dept: CASE MANAGEMENT | Age: 46
End: 2021-08-06

## 2021-08-06 NOTE — PROGRESS NOTES
Care Transitions Initial Call    Incoming phone call missed. Telephone all returned to patient. Call within 2 business days of discharge: Yes     Patient: Greg Garcia Patient : 1975 MRN: [id number]    Last Discharge [address]       Complaint Diagnosis Description Type Department Provider    21 Positive For Covid-19 Pneumonia due to COVID-19 virus . .. ED to Hosp-Admission (Discharged) (ADMIT) Marlyn Harman MD; Dana Sheriff, ... Was this an external facility discharge? No Discharge Facility: [facility]     Challenges to be reviewed by the provider   Additional needs identified to be addressed with provider: N/A    PCP does not appear to be a physician with the [facility]     Per patient report she is taking:   Vitamin A   Vitamin D   Vitamin E   Zinc   CTN will attempt to follow up with patient for specifics at next telephone contact. Patient reports cough and referred to PCP for follow up. Method of communication with provider :  N/A       Discussed COVID-19 related testing which was available at this time. Test results were positive. Patient informed of results, if available? yes   Patient advises she is aware of Covid positive test results. Advance Care Planning:   Does patient have an Advance Directive: not on file at this time. Inpatient Readmission Risk score: Unplanned Readmit Risk Score: 8    Was this a readmission? no   Patient stated reason for the admission: n/a     Patients top risk factors for readmission:   - medial condition     Interventions to address risk factors: Obtained and reviewed discharge summary and/or continuity of care documents    Care Transition Nurse (CTN) contacted the patient by telephone to perform post hospital discharge assessment. Verified name and  with patient as identifiers. Provided introduction to self, and explanation of the CTN role.      CTN reviewed discharge instructions, medical action plan and red flags with patient who verbalized understanding. Reviewed appropriate site of care based on symptoms and resources available to patient including: PCP, After hours contact number-Physician office phone number  and When to call 911. Patient given an opportunity to ask questions and does not have any further questions or concerns at this time. The patient agrees to contact the PCP office for questions related to their healthcare. Medication reconciliation was performed with patient, who verbalizes understanding of administration of home medications. Referral to Pharm D needed: no, not at this time    Home Health/Outpatient orders at discharge: none noted at this time   [address]: n/a  Date of initial visit:  [facility] ordered at discharge: None, noted at this time   [address] [facility] [address]:   [facility] received:     Covid Risk Education     CTN reviewed discharge instructions, medical action plan and red flag symptoms with the patient who verbalized understanding. Reviewed  COVID vaccination status: per chart review, patient had not been vaccinated prior to hospital admission. Exposure protocols and quarantine with CDC Guidelines reviewed. Patient  was given an opportunity to verbalize any questions and concerns and agrees to contact CTN or health care provider for questions related to their healthcare. Was patient discharged with a pulse oximeter? No, per chart review. Discussed follow-up appointments. If no appointment was previously scheduled, appointment scheduling offered: no. Is follow up appointment scheduled within 7 days of discharge? yes. Patient advises that she had a virtual visit with PCP post hospital discharge. [facility] follow up appointment(s): No future appointments. Non-[facility] follow up appointment(s):   None noted at this time.     Plan for follow-up call in 5-7 days/as needed  based on severity of symptoms and risk factors. Plan for next call:   - Follow up with status of patient   - Follow up with medication review, any changes  -  Attempt to follow up with vaccine education       CTN provided contact information for future needs. Goals Addressed                 This Visit's Progress     Attends follow-up appointments as directed. Target Date:  9-6-2021    Patient to follow up as below  - Patient reports she has attended a PCP follow up appointment post hospital discharge.  Prevent complications post hospitalization. Target Date:  9-6-2021 8/6/2021   Patient and/or family members were alerted to the availability of physician or other advanced practioners after hours, weekends, and holidays. Please call the PCP office phone number and speak with the answering service for assistance. Discussed with patient to please call 911 for emergency assistance as needed. Care Transitions Nurse ( CTN)  contact information provided for follow up as needed. Patient referred to PCP for report of cough          Understands red flags post discharge.         Target Date: 9-6-2021    Care Transitions Nurse (CTN) reviewed red flags with patient as follows:   Please call 911 for immediate assistance for symptoms such as:   - Chest Pain  - Severe shortness of breath   - Change in mental status   - Blue tint to face or lips   - New or worsening symptoms    - high fevers   - other concerns     Patient referred to PCP for report of cough

## 2022-03-18 PROBLEM — E66.9 OBESITY (BMI 30-39.9): Status: ACTIVE | Noted: 2021-07-22

## 2022-03-19 PROBLEM — U07.1 PNEUMONIA DUE TO COVID-19 VIRUS: Status: ACTIVE | Noted: 2021-07-22

## 2022-03-19 PROBLEM — J12.82 PNEUMONIA DUE TO COVID-19 VIRUS: Status: ACTIVE | Noted: 2021-07-22

## 2022-03-19 PROBLEM — R09.02 HYPOXIA: Status: ACTIVE | Noted: 2021-07-22

## 2023-03-21 NOTE — PROGRESS NOTES
Hospitalist Progress Note    Patient: Bertie Ormond MRN: [id number]  CSN: [id number]    YOB: 1975  Age: 54 y.o. Sex: female    DOA: 7/22/2021 LOS:  LOS: 3 days                Assessment and Plan:    Principal Problem:    Hypoxia (7/22/2021)    Active Problems:    Pneumonia due to COVID-19 virus (7/22/2021)      Obesity (BMI 30-39.9) (7/22/2021)      COVID 19 pneumonia - On oxygen by NC  Wean as tolerated  Incentive spirometry  Dexamethasone day  4/10    lovenox prophylaxis        possible discharge tomorrow if off oxygen     Chief complaint:  42-year-old female with obesity who is unvaccinated for Covid is admitted for pneumonia due to COVID-19 and hypoxia.         Subjective:    Feels better today        Review of systems:    General: No fevers or chills. Cardiovascular: No chest pain or pressure. No palpitations. Pulmonary: No shortness of breath. Gastrointestinal: No nausea, vomiting. Objective:    Vital signs/Intake and Output:  Visit Vitals  /85   Pulse 62   Temp 99 °F (37.2 °C)   Resp 18   Ht 5' 2\" (1.575 m)   Wt 93.1 kg (205 lb 4.8 oz)   SpO2 95%   BMI 37.55 kg/m²     Current Shift:  07/25 0701 - 07/25 1900  In: 360 [P.O.:360]  Out: -   Last three shifts:  07/23 1901 - 07/25 0700  In: 1560 [P.O.:1560]  Out: 700 [Urine:700]    Physical Exam:  General: NAD, AAOx3. Non-toxic. HEENT: NC/AT. PERRLA, EOMI.  MMM. Lungs: Nml inspection. CTA B/L. No wheezing, rales or rhonchi. Heart:  S1S2 RRR,  PMI mid 5th IC space. No M/RG. Abdomen: Soft, NT/ND.  BS+. No peritoneal signs. Extremities: No C/C/E. Psych:   Nml affect. Neurologic:  2-12 intact. Strength 5/5 throughout.   Sensation symmetrical.          Labs: Results:       Chemistry Recent Labs     07/25/21  0550 07/24/21  0505 07/23/21  0655   * 125* 152*    140 141   K 3.5 4.4 3.9    105 107   CO2 30 30 29   BUN 11 10 7   CREA 0.66 0.68 0.49*   CA 8.6 8.8 8.9   AGAP 6 5 5   BUCR 17 15 14    112 113   TP 6.9 6.8 7.3   ALB 2.7* 2.6* 2.7*   GLOB 4.2* 4.2* 4.6*   AGRAT 0.6* 0.6* 0.6*      CBC w/Diff Recent Labs     07/25/21  0550 07/24/21  0505 07/23/21  0655 07/22/21  1750 07/22/21  1750   WBC 12.5 13.0 5.1   < > 7.6   RBC 4.56 4.61 4.74   < > 5.16   HGB 13.3 13.2 13.8   < > 15.0   HCT 41.1 41.7 42.5   < > 46.9*    265 247   < > 263   GRANS  --   --   --   --  76*   LYMPH  --   --   --   --  14*   EOS  --   --   --   --  0    < > = values in this interval not displayed. Cardiac Enzymes Recent Labs     07/22/21  1750   CPK 51   CKND1 CALCULATION NOT PERFORMED WHEN RESULT IS BELOW LINEAR LIMIT      Coagulation No results for input(s): PTP, INR, APTT, INREXT in the last 72 hours. Lipid Panel Lab Results   Component Value Date/Time    Cholesterol, total 183 07/23/2021 06:55 AM    HDL Cholesterol 47 07/23/2021 06:55 AM    LDL, calculated 87.6 07/23/2021 06:55 AM    VLDL, calculated 48.4 07/23/2021 06:55 AM    Triglyceride 242 (H) 07/23/2021 06:55 AM    CHOL/HDL Ratio 3.9 07/23/2021 06:55 AM      BNP No results for input(s): BNPP in the last 72 hours.    Liver Enzymes Recent Labs     07/25/21  0550   TP 6.9   ALB 2.7*         Thyroid Studies No results found for: T4, T3U, TSH, TSHEXT     Procedures/imaging: see electronic medical records for all procedures/Xrays and details which were not copied into this note but were reviewed prior to creation of Plan Graft Cartilage Fenestration Text: The cartilage was fenestrated with a 2mm punch biopsy to help facilitate graft survival and healing.

## 2023-10-09 ENCOUNTER — HOSPITAL ENCOUNTER (EMERGENCY)
Facility: HOSPITAL | Age: 48
Discharge: HOME | End: 2023-10-10
Attending: EMERGENCY MEDICINE
Payer: OTHER GOVERNMENT

## 2023-10-09 DIAGNOSIS — G44.82 COITAL HEADACHE: Primary | ICD-10-CM

## 2023-10-09 DIAGNOSIS — R03.0 ELEVATED BLOOD PRESSURE READING WITHOUT DIAGNOSIS OF HYPERTENSION: ICD-10-CM

## 2023-10-09 PROCEDURE — 99284 EMERGENCY DEPT VISIT MOD MDM: CPT

## 2023-10-09 ASSESSMENT — PAIN SCALES - GENERAL: PAINLEVEL_OUTOF10: 10

## 2023-10-09 ASSESSMENT — PAIN DESCRIPTION - LOCATION: LOCATION: HEAD

## 2023-10-09 ASSESSMENT — PAIN - FUNCTIONAL ASSESSMENT: PAIN_FUNCTIONAL_ASSESSMENT: 0-10

## 2023-10-10 ENCOUNTER — HOSPITAL ENCOUNTER (EMERGENCY)
Facility: HOSPITAL | Age: 48
Discharge: HOME | End: 2023-10-13
Payer: OTHER GOVERNMENT

## 2023-10-10 VITALS
HEART RATE: 84 BPM | RESPIRATION RATE: 16 BRPM | TEMPERATURE: 98.4 F | BODY MASS INDEX: 38.64 KG/M2 | OXYGEN SATURATION: 100 % | SYSTOLIC BLOOD PRESSURE: 140 MMHG | HEIGHT: 62 IN | WEIGHT: 210 LBS | DIASTOLIC BLOOD PRESSURE: 77 MMHG

## 2023-10-10 LAB
ALBUMIN SERPL-MCNC: 3.3 G/DL (ref 3.4–5)
ALBUMIN/GLOB SERPL: 0.8 (ref 0.8–1.7)
ALP SERPL-CCNC: 59 U/L (ref 45–117)
ALT SERPL-CCNC: 32 U/L (ref 13–56)
ANION GAP SERPL CALC-SCNC: 4 MMOL/L (ref 3–18)
AST SERPL-CCNC: 17 U/L (ref 10–38)
BASOPHILS # BLD: 0.1 K/UL (ref 0–0.1)
BASOPHILS NFR BLD: 0 % (ref 0–2)
BILIRUB SERPL-MCNC: 0.4 MG/DL (ref 0.2–1)
BUN SERPL-MCNC: 7 MG/DL (ref 7–18)
BUN/CREAT SERPL: 10 (ref 12–20)
CALCIUM SERPL-MCNC: 8.8 MG/DL (ref 8.5–10.1)
CHLORIDE SERPL-SCNC: 109 MMOL/L (ref 100–111)
CO2 SERPL-SCNC: 28 MMOL/L (ref 21–32)
CREAT SERPL-MCNC: 0.73 MG/DL (ref 0.6–1.3)
DIFFERENTIAL METHOD BLD: ABNORMAL
EOSINOPHIL # BLD: 0.3 K/UL (ref 0–0.4)
EOSINOPHIL NFR BLD: 2 % (ref 0–5)
ERYTHROCYTE [DISTWIDTH] IN BLOOD BY AUTOMATED COUNT: 14 % (ref 11.6–14.5)
GLOBULIN SER CALC-MCNC: 4 G/DL (ref 2–4)
GLUCOSE SERPL-MCNC: 107 MG/DL (ref 74–99)
HCT VFR BLD AUTO: 41.6 % (ref 35–45)
HGB BLD-MCNC: 13.6 G/DL (ref 12–16)
IMM GRANULOCYTES # BLD AUTO: 0 K/UL (ref 0–0.04)
IMM GRANULOCYTES NFR BLD AUTO: 0 % (ref 0–0.5)
LYMPHOCYTES # BLD: 3.7 K/UL (ref 0.9–3.6)
LYMPHOCYTES NFR BLD: 27 % (ref 21–52)
MCH RBC QN AUTO: 30 PG (ref 24–34)
MCHC RBC AUTO-ENTMCNC: 32.7 G/DL (ref 31–37)
MCV RBC AUTO: 91.6 FL (ref 78–100)
MONOCYTES # BLD: 0.8 K/UL (ref 0.05–1.2)
MONOCYTES NFR BLD: 6 % (ref 3–10)
NEUTS SEG # BLD: 9.1 K/UL (ref 1.8–8)
NEUTS SEG NFR BLD: 65 % (ref 40–73)
NRBC # BLD: 0 K/UL (ref 0–0.01)
NRBC BLD-RTO: 0 PER 100 WBC
PLATELET # BLD AUTO: 269 K/UL (ref 135–420)
PMV BLD AUTO: 9.5 FL (ref 9.2–11.8)
POTASSIUM SERPL-SCNC: 4.3 MMOL/L (ref 3.5–5.5)
PROT SERPL-MCNC: 7.3 G/DL (ref 6.4–8.2)
RBC # BLD AUTO: 4.54 M/UL (ref 4.2–5.3)
SODIUM SERPL-SCNC: 141 MMOL/L (ref 136–145)
WBC # BLD AUTO: 14 K/UL (ref 4.6–13.2)

## 2023-10-10 PROCEDURE — 6360000002 HC RX W HCPCS: Performed by: EMERGENCY MEDICINE

## 2023-10-10 PROCEDURE — 6370000000 HC RX 637 (ALT 250 FOR IP): Performed by: EMERGENCY MEDICINE

## 2023-10-10 PROCEDURE — 85025 COMPLETE CBC W/AUTO DIFF WBC: CPT

## 2023-10-10 PROCEDURE — 80053 COMPREHEN METABOLIC PANEL: CPT

## 2023-10-10 PROCEDURE — 6360000004 HC RX CONTRAST MEDICATION: Performed by: EMERGENCY MEDICINE

## 2023-10-10 PROCEDURE — 70450 CT HEAD/BRAIN W/O DYE: CPT

## 2023-10-10 PROCEDURE — 2580000003 HC RX 258: Performed by: EMERGENCY MEDICINE

## 2023-10-10 PROCEDURE — 96375 TX/PRO/DX INJ NEW DRUG ADDON: CPT

## 2023-10-10 PROCEDURE — 96374 THER/PROPH/DIAG INJ IV PUSH: CPT

## 2023-10-10 PROCEDURE — 70496 CT ANGIOGRAPHY HEAD: CPT

## 2023-10-10 RX ORDER — LORAZEPAM 2 MG/ML
1 INJECTION INTRAMUSCULAR ONCE
Status: COMPLETED | OUTPATIENT
Start: 2023-10-10 | End: 2023-10-10

## 2023-10-10 RX ORDER — METOCLOPRAMIDE HYDROCHLORIDE 5 MG/ML
5 INJECTION INTRAMUSCULAR; INTRAVENOUS
Status: COMPLETED | OUTPATIENT
Start: 2023-10-10 | End: 2023-10-10

## 2023-10-10 RX ORDER — 0.9 % SODIUM CHLORIDE 0.9 %
1000 INTRAVENOUS SOLUTION INTRAVENOUS ONCE
Status: COMPLETED | OUTPATIENT
Start: 2023-10-10 | End: 2023-10-10

## 2023-10-10 RX ORDER — BUTALBITAL, ACETAMINOPHEN AND CAFFEINE 300; 40; 50 MG/1; MG/1; MG/1
1 CAPSULE ORAL EVERY 6 HOURS PRN
Qty: 20 CAPSULE | Refills: 0 | Status: SHIPPED | OUTPATIENT
Start: 2023-10-10 | End: 2023-10-15

## 2023-10-10 RX ORDER — DIPHENHYDRAMINE HYDROCHLORIDE 50 MG/ML
25 INJECTION INTRAMUSCULAR; INTRAVENOUS
Status: COMPLETED | OUTPATIENT
Start: 2023-10-10 | End: 2023-10-10

## 2023-10-10 RX ORDER — BUTALBITAL, ACETAMINOPHEN AND CAFFEINE 50; 325; 40 MG/1; MG/1; MG/1
1 TABLET ORAL
Status: COMPLETED | OUTPATIENT
Start: 2023-10-10 | End: 2023-10-10

## 2023-10-10 RX ADMIN — LORAZEPAM 1 MG: 2 INJECTION INTRAMUSCULAR; INTRAVENOUS at 01:10

## 2023-10-10 RX ADMIN — DIPHENHYDRAMINE HYDROCHLORIDE 25 MG: 50 INJECTION INTRAMUSCULAR; INTRAVENOUS at 01:09

## 2023-10-10 RX ADMIN — METOCLOPRAMIDE 5 MG: 5 INJECTION, SOLUTION INTRAMUSCULAR; INTRAVENOUS at 01:10

## 2023-10-10 RX ADMIN — SODIUM CHLORIDE 1000 ML: 900 INJECTION, SOLUTION INTRAVENOUS at 01:11

## 2023-10-10 RX ADMIN — IOPAMIDOL 100 ML: 755 INJECTION, SOLUTION INTRAVENOUS at 02:37

## 2023-10-10 RX ADMIN — BUTALBITAL, ACETAMINOPHEN, AND CAFFEINE 1 TABLET: 50; 325; 40 TABLET ORAL at 01:10

## 2023-10-10 NOTE — ED PROVIDER NOTES
EMERGENCY DEPARTMENT HISTORY AND PHYSICAL EXAM    Date: 7/22/2021  Patient Name: Tristan Senior    History of Presenting Illness     Chief Complaint   Patient presents with    Positive For Covid-19         History Provided By: Patient    Chief Complaint: CAXOY-66, SOB    HPI(Context):   5:37 PM  Tristan Senior is a 54 y.o. female who presents to the emergency department via EMS C/O SOB. Associated sxs include fatigue, cough and minimal substernal chest pain with deep inspiration. Pt is on day 7 of COVID sxs with positive test at [place] [address] PCP office several days ago. Pt reports TMax 101. Pt oxygen saturation at  was 86% upon arrival. Placed on 2L NC with increase to 93%. Pt denies vomiting, loss of taste/smell (returned days ago), leg swelling, hx of resp problems, hx of DVT/PE, hx of cardiac problems, and any other sxs or complaints. Pt is nonsmoker. Pt notes her  and family members at home are sick with COVID as well. PCP: Tiffanie Wilde MD        Past History     Past Medical History:  Past Medical History:   Diagnosis Date    Obesity (BMI 30-39. 9) 7/22/2021       Past Surgical History:  History reviewed. No pertinent surgical history. Family History:  Family History   Problem Relation Age of Onset    Glaucoma Mother        Social History:  Social History     Tobacco Use    Smoking status: Never Smoker    Smokeless tobacco: Never Used   Substance Use Topics    Alcohol use: Not on file    Drug use: Not on file       Allergies:  No Known Allergies      Review of Systems   Review of Systems   Constitutional: Positive for chills, fatigue and fever. HENT: Positive for congestion. Respiratory: Positive for cough and shortness of breath. Cardiovascular: Positive for chest pain (some chest pain with deep inspiraton, otherwise none). Negative for leg swelling. Gastrointestinal: Negative for diarrhea and vomiting. Musculoskeletal: Positive for myalgias.    Allergic/Immunologic: Negative for immunocompromised state. All other systems reviewed and are negative. Physical Exam     Vitals:    07/22/21 1733 07/22/21 1735 07/22/21 1740 07/22/21 2001   BP: (!) 140/78  (!) 149/77    Pulse: 81  78    Resp: 22 22    Temp: 98.2 °F (36.8 °C)      SpO2: (!) 86% 91% 93% 93%   Weight: 93.1 kg (205 lb 4.8 oz)        Physical Exam  Vitals and nursing note reviewed. Constitutional:       General: She is not in acute distress. Appearance: She is well-developed. She is not diaphoretic. Comments:  female in NAD. Alert. No resp distress or acc muscle use. HENT:      Head: Normocephalic and atraumatic. Right Ear: External ear normal.      Left Ear: External ear normal.      Nose: Congestion present. Eyes:      General: No scleral icterus. Right eye: No discharge. Left eye: No discharge. Conjunctiva/sclera: Conjunctivae normal.   Cardiovascular:      Rate and Rhythm: Normal rate and regular rhythm. Heart sounds: Normal heart sounds. No murmur heard. No friction rub. No gallop. Pulmonary:      Effort: Pulmonary effort is normal. No tachypnea, accessory muscle usage or respiratory distress. Breath sounds: Normal breath sounds. No decreased breath sounds, wheezing, rhonchi or rales. Musculoskeletal:         General: Normal range of motion. Cervical back: Normal range of motion. Right lower leg: No edema. Left lower leg: No edema. Skin:     General: Skin is warm and dry. Neurological:      Mental Status: She is alert and oriented to person, place, and time.    Psychiatric:         Judgment: Judgment normal.             Diagnostic Study Results     Labs -     Recent Results (from the past 12 hour(s))   POC LACTIC ACID    Collection Time: 07/22/21  5:45 PM   Result Value Ref Range    Lactic Acid (POC) 1.39 0.40 - 2.00 mmol/L   CBC WITH AUTOMATED DIFF    Collection Time: 07/22/21  5:50 PM   Result Value Ref Range    WBC 7.6 4.6 - 13.2 K/uL    RBC 5.16 4.20 - 5.30 M/uL    HGB 15.0 12.0 - 16.0 g/dL    HCT 46.9 (H) 35.0 - 45.0 %    MCV 90.9 74.0 - 97.0 FL    MCH 29.1 24.0 - 34.0 PG    MCHC 32.0 31.0 - 37.0 g/dL    RDW 12.8 11.6 - 14.5 %    PLATELET 958 168 - 637 K/uL    MPV 9.7 9.2 - 11.8 FL    NEUTROPHILS 76 (H) 40 - 73 %    BAND NEUTROPHILS 6 (H) 0 - 5 %    LYMPHOCYTES 14 (L) 21 - 52 %    MONOCYTES 4 3 - 10 %    EOSINOPHILS 0 0 - 5 %    BASOPHILS 0 0 - 2 %    ABS. NEUTROPHILS 6.2 1.8 - 8.0 K/UL    ABS. LYMPHOCYTES 1.1 0.9 - 3.6 K/UL    ABS. MONOCYTES 0.3 0.05 - 1.2 K/UL    ABS. EOSINOPHILS 0.0 0.0 - 0.4 K/UL    ABS. BASOPHILS 0.0 0.0 - 0.1 K/UL    DF MANUAL      RBC COMMENTS NORMOCYTIC, NORMOCHROMIC      WBC COMMENTS REACTIVE LYMPHS     METABOLIC PANEL, COMPREHENSIVE    Collection Time: 07/22/21  5:50 PM   Result Value Ref Range    Sodium 142 136 - 145 mmol/L    Potassium 4.0 3.5 - 5.5 mmol/L    Chloride 104 100 - 111 mmol/L    CO2 29 21 - 32 mmol/L    Anion gap 9 3.0 - 18 mmol/L    Glucose 102 (H) 74 - 99 mg/dL    BUN 8 7.0 - 18 MG/DL    Creatinine 0.58 (L) 0.6 - 1.3 MG/DL    BUN/Creatinine ratio 14 12 - 20      GFR est AA >60 >60 ml/min/1.73m2    GFR est non-AA >60 >60 ml/min/1.73m2    Calcium 8.7 8.5 - 10.1 MG/DL    Bilirubin, total 0.3 0.2 - 1.0 MG/DL    ALT (SGPT) 77 (H) 13 - 56 U/L    AST (SGOT) 81 (H) 10 - 38 U/L    Alk.  phosphatase 124 (H) 45 - 117 U/L    Protein, total 8.0 6.4 - 8.2 g/dL    Albumin 3.0 (L) 3.4 - 5.0 g/dL    Globulin 5.0 (H) 2.0 - 4.0 g/dL    A-G Ratio 0.6 (L) 0.8 - 1.7     CARDIAC PANEL,(CK, CKMB & TROPONIN)    Collection Time: 07/22/21  5:50 PM   Result Value Ref Range    CK - MB <1.0 <3.6 ng/ml    CK-MB Index  0.0 - 4.0 %     CALCULATION NOT PERFORMED WHEN RESULT IS BELOW LINEAR LIMIT    CK 51 26 - 192 U/L    Troponin-I, QT <0.02 0.0 - 0.045 NG/ML   NT-PRO BNP    Collection Time: 07/22/21  5:50 PM   Result Value Ref Range    NT pro- 0 - 450 PG/ML   D DIMER    Collection Time: 07/22/21  5:50 PM   Result Value Ref Range    D DIMER 1.89 (H) <0.46 ug/ml(FEU)   HCG QL SERUM    Collection Time: 07/22/21  5:50 PM   Result Value Ref Range    HCG, Ql. Negative NEG     IONIZED CALCIUM    Collection Time: 07/22/21  6:50 PM   Result Value Ref Range    Calcium, ionized 1.16 1.12 - 1.32 mmol/L   URINALYSIS W/ RFLX MICROSCOPIC    Collection Time: 07/22/21  7:00 PM   Result Value Ref Range    Color YELLOW      Appearance CLEAR      Specific gravity 1.017 1.005 - 1.030      pH (UA) 8.5 (H) 5.0 - 8.0      Protein Negative NEG mg/dL    Glucose Negative NEG mg/dL    Ketone Negative NEG mg/dL    Bilirubin Negative NEG      Blood Negative NEG      Urobilinogen 1.0 0.2 - 1.0 EU/dL    Nitrites Negative NEG      Leukocyte Esterase Negative NEG     COVID-19 RAPID TEST    Collection Time: 07/22/21  7:00 PM   Result Value Ref Range    Specimen source Nasopharyngeal      COVID-19 rapid test Detected (AA) NOTD     HCG URINE, QL. - POC    Collection Time: 07/22/21  7:06 PM   Result Value Ref Range    Pregnancy test,urine (POC) Negative NEG             CTA CHEST W OR W WO CONT   Final Result      No evidence of a pulmonary embolism or aortic dissection. Diffuse patchy infiltrates and groundglass densities bilaterally suggesting   atypical pneumonia. The findings are typical in appearance for Covid 19   pneumonia. XR CHEST PORT   Final Result   Patchy interstitial and alveolar infiltrates left greater than   right. CT Results  (Last 48 hours)               07/22/21 1943  CTA CHEST W OR W WO CONT Final result    Impression:      No evidence of a pulmonary embolism or aortic dissection. Diffuse patchy infiltrates and groundglass densities bilaterally suggesting   atypical pneumonia. The findings are typical in appearance for Covid 19   pneumonia.            Narrative:  EXAM: CTA chest       INDICATION: Hypoxia Covid positive       COMPARISON: None       TECHNIQUE: Axial CT imaging from the thoracic inlet through the diaphragm with   intravenous contrast. Coronal and sagittal MIP reformats were generated. One or   more dose reduction techniques were used on this CT: automated exposure control,   adjustment of the mAs and/or kVp according to patient size, and iterative   reconstruction techniques. The specific techniques used on this CT exam have   been documented in the patient's electronic medical record. Digital Imaging and   Communications in Medicine (DICOM) format image data are available to   nonaffiliated external healthcare facilities or entities on a secure, media   free, reciprocally searchable basis with patient authorization for at least a   12-month period after this study. _______________       FINDINGS:       EXAM QUALITY: Overall exam quality is satisfactory. Pulmonary arterial   enhancement is adequate with adequate breath hold and no significant artifact. PULMONARY ARTERIES: No evidence of pulmonary embolism. LYMPH Nodes: No enlarged lymph nodes seen. PLEURA: No pleural effusion seen. HEART: Normal in size without pericardial effusion. VASCULATURE/MEDIASTINUM: There is no aortic dissection. Small hiatal hernia   present. LUNGS: There are diffuse bilateral patchy infiltrates and groundglass densities   suggesting atypical pneumonia. The findings are typical in appearance for Covid   19 pneumonia. AIRWAY: Normal.       UPPER ABDOMEN: Unremarkable. OTHER: No acute or aggressive osseous abnormalities identified. _______________               CXR Results  (Last 48 hours)               07/22/21 1820  XR CHEST PORT Final result    Impression:  Patchy interstitial and alveolar infiltrates left greater than   right.        Narrative:  EXAM:  AP Portable Chest X-ray 1 view        INDICATION: Shortness of breath Covid positive cough       COMPARISON: None       _______________       FINDINGS:  Heart and mediastinal contours are within normal limits for portable radiograph. There are patchy alveolar and interstitial infiltrates of both lung   bases left greater than right. . There are no pleural effusions. No acute osseous   findings. ________________                     Medications given in the ED-  Medications   sodium chloride (NS) flush 5-40 mL (has no administration in time range)   sodium chloride (NS) flush 5-40 mL (has no administration in time range)   acetaminophen (TYLENOL) tablet 650 mg (has no administration in time range)     Or   acetaminophen (TYLENOL) suppository 650 mg (has no administration in time range)   polyethylene glycol (MIRALAX) packet 17 g (has no administration in time range)   ondansetron (ZOFRAN ODT) tablet 4 mg (has no administration in time range)     Or   ondansetron (ZOFRAN) injection 4 mg (has no administration in time range)   famotidine (PEPCID) tablet 20 mg (has no administration in time range)   enoxaparin (LOVENOX) injection 30 mg (has no administration in time range)   dexAMETHasone (DECADRON) tablet 6 mg (has no administration in time range)   dexamethasone (PF) (DECADRON) 10 mg/mL injection 10 mg (10 mg IntraVENous Given 7/22/21 1802)   lactated ringers bolus infusion 1,000 mL (0 mL IntraVENous IV Completed 7/22/21 1858)   LORazepam (ATIVAN) tablet 1 mg (1 mg Oral Given 7/22/21 1900)   iopamidoL (ISOVUE-370) 76 % injection  mL (100 mL IntraVENous Given 7/22/21 1933)         Medical Decision Making   I am the first provider for this patient. I reviewed the vital signs, available nursing notes, past medical history, past surgical history, family history and social history. Vital Signs-Reviewed the patient's vital signs. Pulse Oximetry Analysis - 93% on 2L NC     Records Reviewed: Nursing Notes    Provider Notes (Medical Decision Making): COVID-19 PNA with oxygen requirement. Will check labs, CXR, CTA chest, and admit to medicine    Procedures:  Procedures    ED Course:   5:37 PM Initial assessment performed. The patients presenting problems have been discussed, and they are in agreement with the care plan formulated and outlined with them. I have encouraged them to ask questions as they arise throughout their visit. Diagnosis and Disposition     Discussed with attending. Will admit for hypoxia with COVID-19 PNA.    7:59 PM Consult: I discussed care with Dr. Margarita Mclean (on call medicine). It was a standard discussion, including history of patients chief complaint, available diagnostic results, and treatment course. Admit to medical unless CTA chest is + for PE.     8:32 PM  CTA chest negative for PE. Pt notified. 1. Pneumonia due to COVID-19 virus    2. Acute respiratory failure due to COVID-19 ([address]    3. Hypoxia        PLAN:  1. ADMIT to medical isolation bed    _______________________________    Attestations: This note is prepared by Johnsie Boeck, PA-C.  _______________________________      CRITICAL CARE NOTE:  6:37 PM  I have spent 35 minutes of critical care time involved in lab review, consultations with specialist, family decision-making, and documentation. During this entire length of time I was immediately available to the patient. Critical Care: The reason for providing this level of medical care for this critically ill patient was due a critical illness that impaired one or more vital organ systems such that there was a high probability of imminent or life threatening deterioration in the patients condition. This care involved high complexity decision making to assess, manipulate, and support vital system functions, to treat this degreee vital organ system failure and to prevent further life threatening deterioration of the patients condition. Please note that this dictation was completed with [employer], the computer voice recognition software. Quite often unanticipated grammatical, syntax, homophones, and other interpretive errors are inadvertently transcribed by the computer software. Please disregard these errors. Please excuse any errors that have escaped final proofreading. Duration Of Freeze Thaw-Cycle (Seconds): 0 Post-Care Instructions: I reviewed with the patient in detail post-care instructions. Patient is to wear sunprotection, and avoid picking at any of the treated lesions. Pt may apply Vaseline to crusted or scabbing areas. Render Note In Bullet Format When Appropriate: No Show Aperture Variable?: Yes Consent: The patient's consent was obtained including but not limited to risks of crusting, scabbing, blistering, scarring, darker or lighter pigmentary change, recurrence, incomplete removal and infection. Detail Level: Detailed

## 2023-10-10 NOTE — DISCHARGE INSTRUCTIONS
1.  The results of your testing were overall reassuring. Your CAT scan did not reveal any bleeding or any sign of aneurysm or vascular problems. 2.  This is likely a coital headache. This is a specific type of sudden headache that it can occur during sexual activity. 3.  If you develop any worsening including recurrent or severe headache, numbness tingling or weakness of your arms or legs or face, confusion blurred or double vision or any worsening I recommend you return promptly. You should follow-up with a neurologist for reassessment and continued care as well as your primary care doctor. This is important for your continuity of care. 4.  I prescribed you a medicine called butalbital which is a headache medicine that can be used for mild to moderate headache. You can take it if you have mild return of headache if your headache is severe or any new symptoms develop please return promptly. Thank you for allowing us to provide you with excellent care today. We hope we addressed all of your concerns and needs. We strive to provide excellent quality care in the Emergency Department. Anything less than excellent does not meet our expectations for you. Incidental findings are findings on labs or imaging studies that do not necessarily correlate with the reason for your visit. We make every effort to inform you of these incidental findings and the proper follow-up, however it is important that you call your primary care doctor or a primary care doctor in 1 to 2 days to set up a follow-up visit so they can go through your results in detail with you, and determine if additional testing is needed. The emergency room is not intended to be complete or comprehensive care, and it serves as a means to rule out life-threatening pathologies. It is very important that you follow-up with your primary care doctor to arrange additional testing and/or treatment if needed.     The exam and treatment you received in

## 2023-10-10 NOTE — ED TRIAGE NOTES
Pt ambulatory to triage c/o HA since Saturday when she was having a climatic event. Pt has tried tylenol and ibuprofen and nothing is helpings.

## 2023-10-10 NOTE — ED NOTES
Pt resting on bp/o2 monitoring, respirations even/unlabored.     Nad at this time     Mary Mathias  10/10/23 [id number]

## 2023-10-10 NOTE — ED PROVIDER NOTES
have this rechecked. ED Course:         ED Course as of 10/10/23 1915   Tue Oct 10, 2023   0156 Reassessed, currently pending CT scans. Patient resting comfortably states headache is down to a 2 out of 10. [JOBY]   [id number] Patient reassessed, headache now resolved resting comfortably. CT was negative and CTA demonstrates no sign of aneurysm. I suspect this is likely a coital headache as opposed to subarachnoid hemorrhage due to sentinel bleed. Discussed with the patient low likelihood of this however LP could be considered patient feels comfortable foregoing LP and being discharged. We will have her follow-up with neurology and have patient follow-up with her primary care as well. [JOBY]      ED Course User Index  [JOBY] Phuong Lam DO       ------------------------------------------------------------------------------------------------------------        Consultations:     CONSULTS:  None    See the ED course section or MDM, if applicable for details on the content of consultations requested. Procedures and Critical Care       Performed by: Phuong Lam DO    Procedures             CRITICAL CARE NOTE :  7:18 PM  CRITICAL CARE TIME: Does not meet criteria for critical care time, 0 Minutes. Phuong Lam DO      Disposition       Disposition: Discharged Home    DISCHARGE: At this time, patient is stable and appropriate for discharge home. Patient demonstrates understanding of current diagnoses and is in agreement with the treatment plan. They are advised that while the likelihood of serious underlying condition is low at this point given the evaluation performed today, we cannot fully rule it out. They are advised to immediately return with any new symptoms or worsening of current condition.  Any Incidental findings were noted on the patient's discharge paperwork as well as verbally directly to the patient, and the appropriate follow-up was given to the patient as far as instructions on testing needed